# Patient Record
Sex: FEMALE | Race: WHITE | HISPANIC OR LATINO | Employment: FULL TIME | URBAN - METROPOLITAN AREA
[De-identification: names, ages, dates, MRNs, and addresses within clinical notes are randomized per-mention and may not be internally consistent; named-entity substitution may affect disease eponyms.]

---

## 2017-02-17 ENCOUNTER — NON-PROVIDER VISIT (OUTPATIENT)
Dept: URGENT CARE | Facility: PHYSICIAN GROUP | Age: 27
End: 2017-02-17

## 2017-02-17 DIAGNOSIS — Z02.1 PRE-EMPLOYMENT DRUG SCREENING: ICD-10-CM

## 2017-02-17 LAB
AMP AMPHETAMINE: POSITIVE
BAR BARBITURATES: NORMAL
BZO BENZODIAZEPINES: NORMAL
COC COCAINE: NORMAL
INT CON NEG: NEGATIVE
INT CON POS: POSITIVE
MDMA ECSTASY: NORMAL
MET METHAMPHETAMINES: NORMAL
MTD METHADONE: NORMAL
OPI OPIATES: NORMAL
OXY OXYCODONE: NORMAL
PCP PHENCYCLIDINE: NORMAL
POC URINE DRUG SCREEN OCDRS: NORMAL
THC: NORMAL

## 2017-02-17 PROCEDURE — 80305 DRUG TEST PRSMV DIR OPT OBS: CPT | Performed by: PHYSICIAN ASSISTANT

## 2017-02-17 NOTE — MR AVS SNAPSHOT
Tayler Diaz-Phyllis   2017 11:45 AM   Non-Provider Visit   MRN: [id number]    Department:  [facility]   Dept Phone:  [phone]    Description:  Female : 1990   Provider:  MICHAEL [facility]           Reason for Visit     Drug / Alcohol Assessment           Allergies as of 2017     Allergen Noted Reactions    No Known Drug Allergy 2011         You were diagnosed with     Pre-employment drug screening   [[id number]]         Vital Signs     Smoking Status                   Former Smoker           Basic Information     Date Of Birth Sex Race Ethnicity Preferred Language    1990 Female  or   Origin (Cymraes,Samoan,Indian,East [place]orese, etc) English      Problem List              ICD-10-CM Priority Class Noted - Resolved    Obese E66.9   2011 - Present    Seasonal allergies J30.2   2013 - Present    Sinusitis J32.9   2013 - Present      Health Maintenance        Date Due Completion Dates    PAP SMEAR 2014    IMM INFLUENZA (1) 2011    IMM DTaP/Tdap/Td Vaccine (2 - Td) 2021            Results     POCT 11 Panel Urine Drug Screen      Component    AMPHETAMINE    positive    COCAINE    POC THC    METHAMPHETAMINES    OPIATES    PHENCYCLIDINE    BENZODIAZIPINES    BARBITURATES    METHADONE    MDMA Ecstasy    OXYCODONE    Urine Drug Screen    positive for AMP    Internal Control Positive    Positive    Internal Control Negative    Negative                        Current Immunizations     HPV Quadrivalent Vaccine (GARDASIL) 3/19/2014, 10/16/2013, 2013    Influenza TIV (IM) 2011  5:15 AM    MMR Vaccine 2011 10:45 AM    Tdap Vaccine 2011  5:15 AM      Below and/or attached are the medications your provider expects you to take. Review all of your home medications and newly ordered medications with your provider and/or pharmacist. Follow medication instructions as directed by your provider and/or  pharmacist. Please keep your medication list with you and share with your provider. Update the information when medications are discontinued, doses are changed, or new medications (including over-the-counter products) are added; and carry medication information at all times in the event of emergency situations     Allergies:  NO KNOWN DRUG ALLERGY - (reactions not documented)               Medications  Valid as of: February 17, 2017 -  2:36 PM    Generic Name Brand Name Tablet Size Instructions for use    Amoxicillin-Pot Clavulanate (Tab) AUGMENTIN 875-125 MG Take 1 Tab by mouth 2 times a day.        Hydrocod Polst-Chlorphen Polst (Liquid CR) TUSSIONEX 10-8 MG/5ML Take 5 mL by mouth every 12 hours.        Nutritional Supplements   Take  by mouth.        Terbinafine HCl (Tab) LAMISIL 250 MG Take 1 Tab by mouth every day.        .                 Medicines prescribed today were sent to:     [facility]  [place] - [address]    [address] [facility] [address]    Phone: [phone] Fax: [phone]    Open 24 Hours?: No    [facility] [address] [place] [address] [place] [address] [address] [facility] [address]    [facility] [place] [address]    Phone: [phone] Fax: [phone]    Open 24 Hours?: No      Medication refill instructions:       If your prescription bottle indicates you have medication refills left, it is not necessary to call your provider’s office. Please contact your pharmacy and they will refill your medication.    If your prescription bottle indicates you do not have any refills left, you may request refills at any time through one of the following ways: The online [employer] system (except Urgent Care), by calling your provider’s office, or by asking your pharmacy to contact your provider’s office with a refill request. Medication refills are processed only during regular business hours and may not be available until the next business day. Your provider may request additional  information or to have a follow-up visit with you prior to refilling your medication.   *Please Note: Medication refills are assigned a new Rx number when refilled electronically. Your pharmacy may indicate that no refills were authorized even though a new prescription for the same medication is available at the pharmacy. Please request the medicine by name with the pharmacy before contacting your provider for a refill.        Instructions    Tayler Ellison is a 27 y.o. female here for a non-provider visit for uds    If abnormal was an in office provider notified today (if so, indicate provider)? No  Routed to PCP? No         Other Notes About Your Plan      IOL scheduled for: 11/20 @ 0800 (pitocin). Pt needs instructions.           Qloo Access Code: [email or web address]  Expires: 3/19/2017  2:36 PM    Qloo  A secure, online tool to manage your health information     [employer]’s Qloo® is a secure, online tool that connects you to your personalized health information from the privacy of your home -- day or night - making it very easy for you to manage your healthcare. Once the activation process is completed, you can even access your medical information using the Qloo jethro, which is available for free in the Apple Jethro store or Google Play store.     Qloo provides the following levels of access (as shown below):   My Chart Features   [facility] Doctor [facility]  Specialists [facility]  Urgent  Care [facility]  Primary Care  Doctor   Email your healthcare team securely and privately 24/7 X X X    Manage appointments: schedule your next appointment; view details of past/upcoming appointments X      Request prescription refills. X      View recent personal medical records, including lab and immunizations X X X X   View health record, including health history, allergies, medications X X X X   Read reports about your outpatient visits, procedures, consult and ER notes X X X X   See your discharge  summary, which is a recap of your hospital and/or ER visit that includes your diagnosis, lab results, and care plan. X X       How to register for TransLattice:  1. Go to  [email or web address].  2. Click on the Sign Up Now box, which takes you to the New Member Sign Up page. You will need to provide the following information:  a. Enter your TransLattice Access Code exactly as it appears at the top of this page. (You will not need to use this code after you’ve completed the sign-up process. If you do not sign up before the expiration date, you must request a new code.)   b. Enter your date of birth.   c. Enter your home email address.   d. Click Submit, and follow the next screen’s instructions.  3. Create a TransLattice ID. This will be your Crewt login ID and cannot be changed, so think of one that is secure and easy to remember.  4. Create a Crewt password. You can change your password at any time.  5. Enter your Password Reset Question and Answer. This can be used at a later time if you forget your password.   6. Enter your e-mail address. This allows you to receive e-mail notifications when new information is available in TransLattice.  7. Click Sign Up. You can now view your health information.    For assistance activating your TransLattice account, call [phone]

## 2017-02-17 NOTE — PATIENT INSTRUCTIONS
Tayler Ellison is a 27 y.o. female here for a non-provider visit for uds    If abnormal was an in office provider notified today (if so, indicate provider)? No  Routed to PCP? No

## 2019-08-21 ENCOUNTER — TELEPHONE (OUTPATIENT)
Dept: SCHEDULING | Facility: IMAGING CENTER | Age: 29
End: 2019-08-21

## 2019-11-08 ENCOUNTER — OFFICE VISIT (OUTPATIENT)
Dept: MEDICAL GROUP | Facility: PHYSICIAN GROUP | Age: 29
End: 2019-11-08
Payer: COMMERCIAL

## 2019-11-08 VITALS
HEIGHT: 62 IN | OXYGEN SATURATION: 98 % | TEMPERATURE: 97.5 F | HEART RATE: 80 BPM | BODY MASS INDEX: 48.03 KG/M2 | WEIGHT: 261 LBS | SYSTOLIC BLOOD PRESSURE: 106 MMHG | DIASTOLIC BLOOD PRESSURE: 78 MMHG

## 2019-11-08 DIAGNOSIS — N64.4 BREAST PAIN, LEFT: ICD-10-CM

## 2019-11-08 DIAGNOSIS — L73.2 AXILLARY HIDRADENITIS SUPPURATIVA: ICD-10-CM

## 2019-11-08 DIAGNOSIS — E66.01 MORBID OBESITY WITH BMI OF 45.0-49.9, ADULT (HCC): ICD-10-CM

## 2019-11-08 DIAGNOSIS — Z00.00 BLOOD TESTS FOR ROUTINE GENERAL PHYSICAL EXAMINATION: ICD-10-CM

## 2019-11-08 PROCEDURE — 99204 OFFICE O/P NEW MOD 45 MIN: CPT | Performed by: PHYSICIAN ASSISTANT

## 2019-11-08 SDOH — HEALTH STABILITY: MENTAL HEALTH: HOW OFTEN DO YOU HAVE A DRINK CONTAINING ALCOHOL?: MONTHLY OR LESS

## 2019-11-08 ASSESSMENT — PATIENT HEALTH QUESTIONNAIRE - PHQ9: CLINICAL INTERPRETATION OF PHQ2 SCORE: 0

## 2019-11-09 ENCOUNTER — HOSPITAL ENCOUNTER (OUTPATIENT)
Dept: LAB | Facility: MEDICAL CENTER | Age: 29
End: 2019-11-09
Attending: PHYSICIAN ASSISTANT
Payer: COMMERCIAL

## 2019-11-09 DIAGNOSIS — Z00.00 BLOOD TESTS FOR ROUTINE GENERAL PHYSICAL EXAMINATION: ICD-10-CM

## 2019-11-09 LAB
ALBUMIN SERPL BCP-MCNC: 3.7 G/DL (ref 3.2–4.9)
ALBUMIN/GLOB SERPL: 1.1 G/DL
ALP SERPL-CCNC: 95 U/L (ref 30–99)
ALT SERPL-CCNC: 30 U/L (ref 2–50)
ANION GAP SERPL CALC-SCNC: 4 MMOL/L (ref 0–11.9)
AST SERPL-CCNC: 28 U/L (ref 12–45)
BILIRUB SERPL-MCNC: 0.5 MG/DL (ref 0.1–1.5)
BUN SERPL-MCNC: 13 MG/DL (ref 8–22)
CALCIUM SERPL-MCNC: 9 MG/DL (ref 8.5–10.5)
CHLORIDE SERPL-SCNC: 106 MMOL/L (ref 96–112)
CHOLEST SERPL-MCNC: 125 MG/DL (ref 100–199)
CO2 SERPL-SCNC: 27 MMOL/L (ref 20–33)
CREAT SERPL-MCNC: 0.66 MG/DL (ref 0.5–1.4)
EST. AVERAGE GLUCOSE BLD GHB EST-MCNC: 111 MG/DL
FASTING STATUS PATIENT QL REPORTED: NORMAL
GLOBULIN SER CALC-MCNC: 3.4 G/DL (ref 1.9–3.5)
GLUCOSE SERPL-MCNC: 75 MG/DL (ref 65–99)
HBA1C MFR BLD: 5.5 % (ref 0–5.6)
HDLC SERPL-MCNC: 40 MG/DL
LDLC SERPL CALC-MCNC: 71 MG/DL
POTASSIUM SERPL-SCNC: 3.9 MMOL/L (ref 3.6–5.5)
PROT SERPL-MCNC: 7.1 G/DL (ref 6–8.2)
SODIUM SERPL-SCNC: 137 MMOL/L (ref 135–145)
TRIGL SERPL-MCNC: 70 MG/DL (ref 0–149)
TSH SERPL DL<=0.005 MIU/L-ACNC: 0.75 UIU/ML (ref 0.38–5.33)

## 2019-11-09 PROCEDURE — 80061 LIPID PANEL: CPT

## 2019-11-09 PROCEDURE — 80053 COMPREHEN METABOLIC PANEL: CPT

## 2019-11-09 PROCEDURE — 84443 ASSAY THYROID STIM HORMONE: CPT

## 2019-11-09 PROCEDURE — 36415 COLL VENOUS BLD VENIPUNCTURE: CPT

## 2019-11-09 PROCEDURE — 83036 HEMOGLOBIN GLYCOSYLATED A1C: CPT

## 2019-11-09 NOTE — PROGRESS NOTES
"Tayler Ellison is a 29 y.o. female here for left breast pain. Is a new patient to me and [facility] and is also establishing care today.    Previous PCP: Dr. Luciano Silva's    HPI:      Patient presents to the office today to establish care with me.  Her primary concern is regarding left breast pain that she has been experiencing recently.  She feels the pain around her nipple.  She is requesting referral to Dr. Yumiko Wyman, whom she has seen in the past on multiple occasions for issues with her right breast.  Patient states that she has had several surgeries on the right breast for \"clogged ducts.\"  She has not noticed any masses of her breasts.    She also expresses concern regarding recurrent small abscesses in her armpits.  Has had this issue for quite a while, endorses previous antibiotics but it never seems to get any better.      Current medicines (including changes today)  No current outpatient medications on file.     No current facility-administered medications for this visit.      She  has a past medical history of Allergy, Depression, GERD (gastroesophageal reflux disease), Headache(784.0), and Urinary tract infection, site not specified. She also has no past medical history of Addisons disease (HCC), Adrenal disorder (HCC), Anemia, Anxiety, Arrhythmia, Arthritis, ASTHMA, Blood transfusion, Cancer (HCC), CATARACT, CHF (congestive heart failure) (HCC), Clotting disorder (HCC), COPD, Cushings syndrome (HCC), Diabetes, Diabetic neuropathy (HCC), EMPHYSEMA, Glaucoma, Goiter, Heart attack (HCC), Heart murmur, HIV (human immunodeficiency virus infection), Hyperlipidemia, Hypertension, IBD (inflammatory bowel disease), Kidney disease, Meningitis, Migraine, Muscle disorder, OSTEOPOROSIS, Parathyroid disorder (HCC), Pituitary disease (HCC), Seizure (HCC), Sickle cell disease (HCC), Stroke (HCC), Substance abuse (HCC), Thyroid disease, Tuberculosis, or Ulcer.  She  has a past surgical history that includes " "lumpectomy (Right).  Social History     Tobacco Use   • Smoking status: Never Smoker   • Smokeless tobacco: Never Used   Substance Use Topics   • Alcohol use: Yes     Frequency: Monthly or less     Comment: occasonal/weekend    • Drug use: No     Social History     Patient does not qualify to have social determinant information on file (likely too young).   Social History Narrative   • Not on file     Family History   Problem Relation Age of Onset   • Diabetes Mother         insulin   • Heart Disease Mother    • Heart Disease Sister    • Diabetes Paternal Aunt    • Heart Disease Paternal Aunt    • Diabetes Paternal Uncle    • Heart Disease Paternal Uncle      Family Status   Relation Name Status   • Mo  Alive   • Fa  Alive   • Sis 3 Alive   • Bro 3 Alive   • MGMo     • MGFa  Alive   • PGMo     • PGFa     • MAunt  Alive   • MUnc  Alive   • PAunt  Alive   • PUnc  Alive         ROS  Pertinent ROS as per HPI  All other systems reviewed and are negative     Objective:     /78 (BP Location: Right arm, Patient Position: Sitting, BP Cuff Size: Large adult)   Pulse 80   Temp 36.4 °C (97.5 °F)   Ht 1.575 m (5' 2\")   Wt 118.4 kg (261 lb)   SpO2 98%  Body mass index is 47.74 kg/m².  Physical Exam:    Constitutional: Alert, morbidly obese but otherwise well-appearing, very pleasant, no distress.  Skin: Warm, dry, good turgor. Multiple hypertrophic scars noted in bilateral axillae.  Eye: Equal, round and reactive, conjunctiva clear, lids normal.  ENMT: Lips without lesions, good dentition, oropharynx clear.  Neck: Trachea midline, no masses, no thyromegaly. No cervical or supraclavicular lymphadenopathy.  Breast: Breasts are normal-appearing and symmetric bilaterally. Well-healed surgical scars visible on right breast. Otherwise no skin abnormalities visualized. There are no masses palpated, +tenderness of left nipple/areola. No visible nipple discharge. No axillary or supraclavicular " lymphadenopathy palpated.  Respiratory: Unlabored respiratory effort, lungs clear to auscultation, no wheezes, no ronchi.  Cardiovascular: Normal S1, S2, no murmur, no edema.  Abdomen: Soft, non-tender, no masses, no hepatosplenomegaly.  Psych: Alert and oriented x3, normal affect and mood.      Assessment and Plan:   The following treatment plan was discussed    1. Breast pain, left  New problem, uncontrolled. Having new-onset left breast pain/tenderness in nipple area. Recommend US to further evaluate. I have placed referral for her to follow-up with her previous breast surgeon, Dr. Wyman.  - US-BREAST LIMITED-LEFT; Future  - REFERRAL TO GENERAL SURGERY    2. Axillary hidradenitis suppurativa  New problem, uncontrolled. Exam consistent with HS. Recommend further evaluation by dermatology which I have ordered.  - REFERRAL TO DERMATOLOGY    3. Morbid obesity with BMI of 45.0-49.9, adult (HCC)  - REFERRAL TO [facility] (HIP) Services Requested: Physician Medical Weight Management Program, Registered Dietitian for Medical Nutrition Therapy; Reason for Referral? BMI>30; Reason for Visit: Overweight/Obesity  - Patient identified as having weight management issue.  Appropriate orders and counseling given.    4. Blood tests for routine general physical examination  Patient advised to have fasting screening lab work done at earliest convenience. Is at risk for metabolic syndrome given her obesity.  - Comp Metabolic Panel; Future  - HEMOGLOBIN A1C; Future  - TSH WITH REFLEX TO FT4; Future  - Lipid Profile; Future      Followup: Return for f/u pending lab results.    Leticia Haq P.A.-C.

## 2019-12-18 ENCOUNTER — HOSPITAL ENCOUNTER (OUTPATIENT)
Dept: RADIOLOGY | Facility: MEDICAL CENTER | Age: 29
End: 2019-12-18

## 2019-12-18 ENCOUNTER — HOSPITAL ENCOUNTER (OUTPATIENT)
Dept: RADIOLOGY | Facility: MEDICAL CENTER | Age: 29
End: 2019-12-18
Attending: PHYSICIAN ASSISTANT

## 2019-12-19 ENCOUNTER — HOSPITAL ENCOUNTER (OUTPATIENT)
Dept: RADIOLOGY | Facility: MEDICAL CENTER | Age: 29
End: 2019-12-19
Attending: PHYSICIAN ASSISTANT
Payer: COMMERCIAL

## 2019-12-19 DIAGNOSIS — N64.4 BREAST PAIN, LEFT: ICD-10-CM

## 2019-12-19 PROCEDURE — 76642 ULTRASOUND BREAST LIMITED: CPT | Mod: LT

## 2019-12-19 PROCEDURE — G0279 TOMOSYNTHESIS, MAMMO: HCPCS

## 2020-01-13 ENCOUNTER — OFFICE VISIT (OUTPATIENT)
Dept: DERMATOLOGY | Facility: IMAGING CENTER | Age: 30
End: 2020-01-13
Payer: COMMERCIAL

## 2020-01-13 VITALS — BODY MASS INDEX: 47.29 KG/M2 | HEIGHT: 62 IN | WEIGHT: 257 LBS

## 2020-01-13 DIAGNOSIS — L73.2 HIDRADENITIS SUPPURATIVA: ICD-10-CM

## 2020-01-13 PROCEDURE — 99203 OFFICE O/P NEW LOW 30 MIN: CPT | Performed by: DERMATOLOGY

## 2020-01-13 RX ORDER — DOXYCYCLINE 100 MG/1
100 CAPSULE ORAL 2 TIMES DAILY
Qty: 180 CAP | Refills: 1 | Status: SHIPPED | OUTPATIENT
Start: 2020-01-13 | End: 2020-07-04

## 2020-01-13 RX ORDER — CLINDAMYCIN PHOSPHATE 10 UG/ML
LOTION TOPICAL
Qty: 60 ML | Refills: 5 | Status: SHIPPED | OUTPATIENT
Start: 2020-01-13 | End: 2020-07-04

## 2020-01-13 NOTE — PROGRESS NOTES
DERMATOLOGY NOTE  NEW VISIT     01/13/20  Tayler Ellison  1990  MRN: [id number]     Chief complaint: Establish Care       Tayler Ellison is a 30 y.o. female who presents for     HPI:painful draining lesions in the armpits and left inguinal fold and scarring  Onset: 15 years ago   Symptoms: bumps that fill up with fluid and drain  Aggravating factors: menstrual cycle   Alleviating factors: n/a   Treatments: antibiotics 15 years ago    History of skin cancer: No  History of precancers/actinic keratoses: No  History of biopsies:No  History of blistering/severe sunburns:No  Family history of skin cancer:No  Family history of atypical moles:No      Past Medical History:   Diagnosis Date   • Allergy    • Depression    • GERD (gastroesophageal reflux disease)    • Headache(784.0)    • Urinary tract infection, site not specified         Family History   Problem Relation Age of Onset   • Diabetes Mother         insulin   • Heart Disease Mother    • Heart Disease Sister    • Diabetes Paternal Aunt    • Heart Disease Paternal Aunt    • Diabetes Paternal Uncle    • Heart Disease Paternal Uncle         Social History     Socioeconomic History   • Marital status:      Spouse name: Not on file   • Number of children: Not on file   • Years of education: Not on file   • Highest education level: Not on file   Occupational History   • Not on file   Social Needs   • Financial resource strain: Not on file   • Food insecurity:     Worry: Not on file     Inability: Not on file   • Transportation needs:     Medical: Not on file     Non-medical: Not on file   Tobacco Use   • Smoking status: Never Smoker   • Smokeless tobacco: Never Used   Substance and Sexual Activity   • Alcohol use: Yes     Frequency: Monthly or less     Comment: occasonal/weekend    • Drug use: No   • Sexual activity: Yes     Partners: Male     Birth control/protection: Pill   Lifestyle   • Physical activity:     Days per week: Not on file     Minutes per  "session: Not on file   • Stress: Not on file   Relationships   • Social connections:     Talks on phone: Not on file     Gets together: Not on file     Attends Worship service: Not on file     Active member of club or organization: Not on file     Attends meetings of clubs or organizations: Not on file     Relationship status: Not on file   • Intimate partner violence:     Fear of current or ex partner: Not on file     Emotionally abused: Not on file     Physically abused: Not on file     Forced sexual activity: Not on file   Other Topics Concern   • Not on file   Social History Narrative   • Not on file        Allergies   Allergen Reactions   • No Known Drug Allergy         MEDICATIONS:  Medications relevant to specialty reviewed.  No current outpatient medications on file.     REVIEW OF SYSTEMS:   Positive for skin (see HPI)  Negative for fevers, chills and cough  All other systems reviewed and are negative.     EXAM:  Ht 1.575 m (5' 2\")   Wt 116.6 kg (257 lb)   BMI 47.01 kg/m²   Constitutional: Well-developed, well-nourished, and in no distress.   HENT:   Head: normocephalic and atraumatic.  Right Ear: External ear normal.   Left Ear: External ear normal.   Nose: Nose normal.   Eyes: Conjunctivae and lids are normal.   Neck: Normal range of motion. Neck supple.   Pulmonary/Chest: Effort normal.   Neurological: Alert and oriented.    A focused cutaneous exam was completed including: ears, face, neck, chest, abdomen, bilateral axillae and bilateral inguinal folds with the following pertinent findings listed below. Remaining above-listed examined areas within normal limits / negative for rashes or lesions.     -bilateral axillae and left inguinal fold with diffuse open comedones, sinus tracts, rope-like scarring and few draining firm tender erythematous nodules/cystic lesions       IMPRESSION / PLAN:    1. Hidradenitis suppurativa, Ypa stage 3  - educated patient about diagnosis, management options, and " expectations of treatment  - discussed risk factors/lifestyle modifications that are associated with this disease, including weight loss and not smoking  - discussed bathing habits, recommend chlorhexidine wash (to avoid face and genital area)  - start clindamycin 1% lotion daily to affected areas  - instructed to start doxycycline 100mg BID. Instructed to take with food & water. Side effects including, but not limited to, GI upset, photosensitivity (and need for photoprotection) discussed.   - patient aware that this medication is not safe during pregnancy, and has been advised to stop medication if she is to become pregnant  - discussed benefit of ILK treatment for acutely inflamed lesions, patient was informed to call the office is she needs this intervention   - discussed future treatment options if she fails above management, including Humira or could consider trial of spironolactone given flaring with menses  - discussed cosmetic interventions that may be beneficial, including laser hair removal  - discussed surgical options, would refer to general surgery, declining at this time     Return to clinic in: Return in about 3 months (around 4/13/2020). and as needed for any new or changing skin lesions.    Corry Moran M.D.

## 2020-02-11 ENCOUNTER — HOSPITAL ENCOUNTER (OUTPATIENT)
Facility: MEDICAL CENTER | Age: 30
End: 2020-02-11
Attending: FAMILY MEDICINE
Payer: COMMERCIAL

## 2020-02-11 LAB
ALBUMIN SERPL BCP-MCNC: 3.8 G/DL (ref 3.2–4.9)
ALBUMIN/GLOB SERPL: 1 G/DL
ALP SERPL-CCNC: 100 U/L (ref 30–99)
ALT SERPL-CCNC: 33 U/L (ref 2–50)
ANION GAP SERPL CALC-SCNC: 14 MMOL/L (ref 0–11.9)
AST SERPL-CCNC: 27 U/L (ref 12–45)
BILIRUB SERPL-MCNC: 0.5 MG/DL (ref 0.1–1.5)
BUN SERPL-MCNC: 15 MG/DL (ref 8–22)
CALCIUM SERPL-MCNC: 9.1 MG/DL (ref 8.5–10.5)
CHLORIDE SERPL-SCNC: 106 MMOL/L (ref 96–112)
CO2 SERPL-SCNC: 20 MMOL/L (ref 20–33)
CREAT SERPL-MCNC: 0.75 MG/DL (ref 0.5–1.4)
GLOBULIN SER CALC-MCNC: 3.8 G/DL (ref 1.9–3.5)
GLUCOSE SERPL-MCNC: 91 MG/DL (ref 65–99)
POTASSIUM SERPL-SCNC: 4.4 MMOL/L (ref 3.6–5.5)
PROT SERPL-MCNC: 7.6 G/DL (ref 6–8.2)
SODIUM SERPL-SCNC: 140 MMOL/L (ref 135–145)
TSH SERPL DL<=0.005 MIU/L-ACNC: 0.86 UIU/ML (ref 0.38–5.33)

## 2020-02-11 PROCEDURE — 84443 ASSAY THYROID STIM HORMONE: CPT

## 2020-02-11 PROCEDURE — 80053 COMPREHEN METABOLIC PANEL: CPT

## 2020-07-01 ENCOUNTER — OFFICE VISIT (OUTPATIENT)
Dept: URGENT CARE | Facility: PHYSICIAN GROUP | Age: 30
End: 2020-07-01
Payer: COMMERCIAL

## 2020-07-01 ENCOUNTER — HOSPITAL ENCOUNTER (OUTPATIENT)
Facility: MEDICAL CENTER | Age: 30
End: 2020-07-01
Attending: PHYSICIAN ASSISTANT
Payer: COMMERCIAL

## 2020-07-01 ENCOUNTER — HOSPITAL ENCOUNTER (OUTPATIENT)
Dept: RADIOLOGY | Facility: MEDICAL CENTER | Age: 30
End: 2020-07-01
Attending: PHYSICIAN ASSISTANT
Payer: COMMERCIAL

## 2020-07-01 VITALS
HEART RATE: 73 BPM | WEIGHT: 256 LBS | DIASTOLIC BLOOD PRESSURE: 76 MMHG | SYSTOLIC BLOOD PRESSURE: 146 MMHG | OXYGEN SATURATION: 99 % | TEMPERATURE: 97 F | HEIGHT: 62 IN | BODY MASS INDEX: 47.11 KG/M2 | RESPIRATION RATE: 14 BRPM

## 2020-07-01 DIAGNOSIS — R10.30 LOWER ABDOMINAL PAIN: ICD-10-CM

## 2020-07-01 DIAGNOSIS — R10.2 PELVIC PAIN: ICD-10-CM

## 2020-07-01 DIAGNOSIS — R10.2 PELVIC PAIN: Primary | ICD-10-CM

## 2020-07-01 LAB
APPEARANCE UR: CLEAR
BILIRUB UR STRIP-MCNC: NEGATIVE MG/DL
COLOR UR AUTO: YELLOW
GLUCOSE UR STRIP.AUTO-MCNC: NEGATIVE MG/DL
INT CON NEG: NEGATIVE
INT CON POS: POSITIVE
KETONES UR STRIP.AUTO-MCNC: NEGATIVE MG/DL
LEUKOCYTE ESTERASE UR QL STRIP.AUTO: NORMAL
NITRITE UR QL STRIP.AUTO: NEGATIVE
PH UR STRIP.AUTO: 6 [PH] (ref 5–8)
POC URINE PREGNANCY TEST: NEGATIVE
PROT UR QL STRIP: NEGATIVE MG/DL
RBC UR QL AUTO: NORMAL
SP GR UR STRIP.AUTO: 1.01
UROBILINOGEN UR STRIP-MCNC: 0.2 MG/DL

## 2020-07-01 PROCEDURE — 81025 URINE PREGNANCY TEST: CPT | Performed by: PHYSICIAN ASSISTANT

## 2020-07-01 PROCEDURE — 87086 URINE CULTURE/COLONY COUNT: CPT

## 2020-07-01 PROCEDURE — 81002 URINALYSIS NONAUTO W/O SCOPE: CPT | Performed by: PHYSICIAN ASSISTANT

## 2020-07-01 PROCEDURE — 76830 TRANSVAGINAL US NON-OB: CPT

## 2020-07-01 PROCEDURE — 99215 OFFICE O/P EST HI 40 MIN: CPT | Performed by: PHYSICIAN ASSISTANT

## 2020-07-01 PROCEDURE — 87077 CULTURE AEROBIC IDENTIFY: CPT

## 2020-07-01 PROCEDURE — 87186 SC STD MICRODIL/AGAR DIL: CPT

## 2020-07-01 NOTE — PROGRESS NOTES
Subjective:      Tayler Ellison is a 30 y.o. female who presents with Pelvic Pain (x1day )    Medications:    • CLINDAMYCIN PHOSPHATE(TOPICAL) Lotn  • doxycycline    Allergies: No known drug allergy    Problem List: Tayler Ellison has Obese; Seasonal allergies; Morbid obesity with BMI of 45.0-49.9, adult (HCC); and Axillary hidradenitis suppurativa on their problem list.    Surgical History:  Past Surgical History:   Procedure Laterality Date   • LUMPECTOMY Right     x3       Past Social Hx: Tayler Ellison  reports that she has never smoked. She has never used smokeless tobacco. She reports current alcohol use. She reports that she does not use drugs.     Past Family Hx:  Tayler Ellison family history includes Diabetes in her mother, paternal aunt, and paternal uncle; Heart Disease in her mother, paternal aunt, paternal uncle, and sister.     Problem list, medications, and allergies reviewed by myself today in Epic.            Patient presents with:  Pelvic Pain: x1day , severe, crampy and sharp, bilateral though worse on the left than the right.  Pt and  are actively trying to conceive a child, feels similar to previous ectopic pregnancy that was on the right side even with negative pregnancy test (was dx by US).  Pt denies vaginal bleeding today but noticed some spotting yesterday.  Pt has not done home pregnancy test.  Denies fever, chills, dysuria, or discharge. No hx of diverticulitis. 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      Pelvic Pain   This is a new problem. The current episode started today. The problem occurs constantly. The problem has been rapidly worsening. Associated symptoms include abdominal pain, anorexia and nausea. Pertinent negatives include no change in bowel habit, chest pain, coughing, fever, urinary symptoms or vomiting. The symptoms are aggravated by exertion, walking and standing (palaption). She has tried  "NSAIDs, position changes and rest for the symptoms. The treatment provided no relief.       Review of Systems   Constitutional: Positive for malaise/fatigue. Negative for fever.   Respiratory: Negative for cough.    Cardiovascular: Negative for chest pain.   Gastrointestinal: Positive for abdominal pain, anorexia and nausea. Negative for blood in stool, change in bowel habit, constipation, diarrhea, heartburn and vomiting.   Genitourinary: Positive for pelvic pain. Negative for dysuria, frequency, hematuria and urgency.   All other systems reviewed and are negative.         Objective:     /76 (BP Location: Left arm, Patient Position: Sitting, BP Cuff Size: Large adult)   Pulse 73   Temp 36.1 °C (97 °F) (Temporal)   Resp 14   Ht 1.575 m (5' 2\")   Wt 116.1 kg (256 lb)   SpO2 99%   BMI 46.82 kg/m²      Physical Exam  Vitals signs and nursing note reviewed.   Constitutional:       General: She is not in acute distress.     Appearance: Normal appearance. She is well-developed. She is obese. She is not ill-appearing or toxic-appearing.   HENT:      Head: Normocephalic and atraumatic.      Nose: Nose normal.      Mouth/Throat:      Mouth: Mucous membranes are moist.   Eyes:      Extraocular Movements: Extraocular movements intact.      Conjunctiva/sclera: Conjunctivae normal.      Pupils: Pupils are equal, round, and reactive to light.   Neck:      Musculoskeletal: Normal range of motion and neck supple.   Cardiovascular:      Rate and Rhythm: Normal rate and regular rhythm.      Pulses: Normal pulses.      Heart sounds: Normal heart sounds.   Pulmonary:      Effort: Pulmonary effort is normal.      Breath sounds: Normal breath sounds.   Abdominal:      General: Abdomen is protuberant. Bowel sounds are normal. There is no distension or abdominal bruit.      Palpations: Abdomen is soft.      Tenderness: There is abdominal tenderness in the suprapubic area and left lower quadrant. There is no guarding or " rebound. Negative signs include Magallanes's sign and McBurney's sign.       Musculoskeletal: Normal range of motion.   Skin:     General: Skin is warm and dry.      Capillary Refill: Capillary refill takes less than 2 seconds.   Neurological:      General: No focal deficit present.      Mental Status: She is alert and oriented to person, place, and time.      Gait: Gait normal.   Psychiatric:         Mood and Affect: Mood normal.         Behavior: Behavior is cooperative.             Lab Results   Component Value Date/Time    POCCOLOR yellow 07/01/2020 04:33 PM    POCAPPEAR clear 07/01/2020 04:33 PM    POCLEUKEST trace 07/01/2020 04:33 PM    POCNITRITE negative 07/01/2020 04:33 PM    POCUROBILIGE 0.2 07/01/2020 04:33 PM    POCPROTEIN negative 07/01/2020 04:33 PM    POCURPH 6.0 07/01/2020 04:33 PM    POCBLOOD trace 07/01/2020 04:33 PM    POCSPGRV 1.010 07/01/2020 04:33 PM    POCKETONES negative 07/01/2020 04:33 PM    POCBILIRUBIN negative 07/01/2020 04:33 PM    POCGLUCUA negative 07/01/2020 04:33 PM        RADIOLOGY RESULTS   Ct-renal Colic Evaluation(a/p W/o)    Result Date: 7/2/2020 7/2/2020 11:51 AM HISTORY/REASON FOR EXAM:  abdominal pain. Lower abdominal and pelvic pain. TECHNIQUE/EXAM DESCRIPTION AND NUMBER OF VIEWS: CT scan renal/colic without contrast. 5 mm helical images of the abdomen and pelvis were obtained from the diaphragmatic domes through the pubic symphysis. Low dose optimization technique was utilized for this CT exam including automated exposure control and adjustment of the mA and/or kV according to patient size. COMPARISON: None. FINDINGS: The liver is unremarkable. The spleen is unremarkable. Adrenal glands are unremarkable. The kidneys are unremarkable. The pancreas is unremarkable. The gallbladder is unremarkable. Bladder is unremarkable. Uterus and ovaries are grossly unremarkable. Appendix has a normal appearance. No pneumoperitoneum. Trace pelvic fluid. Sparse colonic diverticula. Abdominal  aorta is unremarkable. No major bony abnormality is seen. Small sclerotic lesion in the LEFT ilium posteriorly consistent with bone island.     1.  No renal stone or hydronephrosis. 2.  Normal appendix. 3.  Colonic diverticulosis without evidence for diverticulitis. 4.  Trace pelvic fluid, likely physiologic.    Us-pelvic Complete (transabdominal/transvaginal) (combo)    Result Date: 7/1/2020 7/1/2020 5:07 PM HISTORY/REASON FOR EXAM:  Pain; history of tubal pregnancy with neg hcg, feels very similar TECHNIQUE/EXAM DESCRIPTION: Transabdominal and transvaginal pelvic ultrasound. COMPARISON:   None FINDINGS: Both transabdominal and transvaginal scanning were performed to optimally visualize the pelvis. The uterus measures 4.75 cm x 8.45 cm x 5.71 cm. The uterine myometrium is within normal limits. The endometrial echo complex measures 0.68 cm. Low resistive waveforms are confirmed within the ovaries. The right ovary measures 4.18 cm x 2.03 cm x 3.12 cm transabdominally. There is likely an involuting cyst in the right ovary measuring 1.6 x 1 x 1.3 cm The left ovary measures 3.00 cm x 1.78 cm x 2.23 cm transabdominally. There is trace free fluid in the pelvis.     Trace free fluid in the pelvis. Probable involuting right ovarian cyst measuring 1.6 cm.            Preg: neg     Assessment/Plan:     1. Pelvic pain  POCT Urinalysis    POCT Pregnancy    US-PELVIC COMPLETE (TRANSABDOMINAL/TRANSVAGINAL) (COMBO)    CT-RENAL COLIC EVALUATION(A/P W/O)    URINE CULTURE(NEW)   2. Lower abdominal pain  CT-RENAL COLIC EVALUATION(A/P W/O)    URINE CULTURE(NEW)     Culture sent to lab, will call with any necessary treatment or treatment changes.     PT can continue OTC medications, increase fluids and rest until symptoms improve.     PT should follow up with PCP in 1-2 days for re-evaluation if symptoms have not improved.  Discussed red flags and reasons to return to UC or ED.  Pt and/or family verbalized understanding of diagnosis and  follow up instructions and was offered informational handout on diagnosis.  PT discharged.

## 2020-07-01 NOTE — LETTER
July 1, 2020         Patient: Tayler Ellison   YOB: 1990   Date of Visit: 7/1/2020           To Whom it May Concern:    Tayler Ellison was seen in my clinic on 7/1/2020. She may return to work on 7/3/2020.    If you have any questions or concerns, please don't hesitate to call.        Sincerely,           Renita Nguyen P.A.-C.  Electronically Signed

## 2020-07-02 ENCOUNTER — TELEPHONE (OUTPATIENT)
Dept: URGENT CARE | Facility: CLINIC | Age: 30
End: 2020-07-02

## 2020-07-02 ENCOUNTER — HOSPITAL ENCOUNTER (OUTPATIENT)
Dept: RADIOLOGY | Facility: MEDICAL CENTER | Age: 30
End: 2020-07-02
Attending: PHYSICIAN ASSISTANT
Payer: COMMERCIAL

## 2020-07-02 DIAGNOSIS — R10.2 PELVIC PAIN: ICD-10-CM

## 2020-07-02 DIAGNOSIS — R10.30 LOWER ABDOMINAL PAIN: ICD-10-CM

## 2020-07-02 PROCEDURE — 74176 CT ABD & PELVIS W/O CONTRAST: CPT

## 2020-07-03 NOTE — TELEPHONE ENCOUNTER
I spoke with Lakesha today regarding her CT results which were positive only for some trace free fluid in her pelvis which was also present on her ultrasound yesterday likely from her involuting ovarian cyst.  It was also positive for some diverticulosis without diverticulitis.  No other abnormal findings.  Patient verbalized appreciation for phone call.  She is aware that her results have been released to her my chart for her to be able to access for review.

## 2020-07-04 DIAGNOSIS — N30.01 ACUTE CYSTITIS WITH HEMATURIA: ICD-10-CM

## 2020-07-04 DIAGNOSIS — R10.2 PELVIC PAIN: ICD-10-CM

## 2020-07-04 RX ORDER — SULFAMETHOXAZOLE AND TRIMETHOPRIM 800; 160 MG/1; MG/1
1 TABLET ORAL 2 TIMES DAILY
Qty: 14 TAB | Refills: 0 | Status: SHIPPED | OUTPATIENT
Start: 2020-07-04 | End: 2020-07-11

## 2020-07-04 ASSESSMENT — ENCOUNTER SYMPTOMS
VOMITING: 0
BLOOD IN STOOL: 0
CHANGE IN BOWEL HABIT: 0
CONSTIPATION: 0
ABDOMINAL PAIN: 1
HEARTBURN: 0
FEVER: 0
NAUSEA: 1
ANOREXIA: 1
COUGH: 0
DIARRHEA: 0

## 2020-07-05 LAB
BACTERIA UR CULT: ABNORMAL
BACTERIA UR CULT: ABNORMAL
SIGNIFICANT IND 70042: ABNORMAL
SITE SITE: ABNORMAL
SOURCE SOURCE: ABNORMAL

## 2020-07-07 ENCOUNTER — TELEPHONE (OUTPATIENT)
Dept: URGENT CARE | Facility: CLINIC | Age: 30
End: 2020-07-07

## 2020-07-07 NOTE — TELEPHONE ENCOUNTER
I called patient this morning to make sure that she had received my notification on my chart that she had a urinary tract infection and had antibiotics to be picked up at the pharmacy.  Patient states she picked them up and has been taking her medication and feels significantly better.  Patient was appreciative for the call.

## 2021-04-01 ENCOUNTER — HOSPITAL ENCOUNTER (OUTPATIENT)
Facility: MEDICAL CENTER | Age: 31
End: 2021-04-01
Attending: OBSTETRICS & GYNECOLOGY | Admitting: OBSTETRICS & GYNECOLOGY
Payer: COMMERCIAL

## 2021-06-18 ENCOUNTER — OFFICE VISIT (OUTPATIENT)
Dept: MEDICAL GROUP | Facility: PHYSICIAN GROUP | Age: 31
End: 2021-06-18
Payer: COMMERCIAL

## 2021-06-18 VITALS
BODY MASS INDEX: 44.9 KG/M2 | WEIGHT: 244 LBS | OXYGEN SATURATION: 96 % | SYSTOLIC BLOOD PRESSURE: 122 MMHG | RESPIRATION RATE: 16 BRPM | HEIGHT: 62 IN | DIASTOLIC BLOOD PRESSURE: 80 MMHG | HEART RATE: 75 BPM | TEMPERATURE: 97.2 F

## 2021-06-18 DIAGNOSIS — Z13.21 ENCOUNTER FOR VITAMIN DEFICIENCY SCREENING: ICD-10-CM

## 2021-06-18 DIAGNOSIS — Z13.220 ENCOUNTER FOR SCREENING FOR LIPID DISORDER: ICD-10-CM

## 2021-06-18 DIAGNOSIS — Z00.00 ENCOUNTER FOR HEALTH MAINTENANCE EXAMINATION IN ADULT: ICD-10-CM

## 2021-06-18 DIAGNOSIS — L65.9 HAIR LOSS: ICD-10-CM

## 2021-06-18 DIAGNOSIS — Z13.29 SCREENING FOR THYROID DISORDER: ICD-10-CM

## 2021-06-18 DIAGNOSIS — L73.2 AXILLARY HIDRADENITIS SUPPURATIVA: ICD-10-CM

## 2021-06-18 PROCEDURE — 99215 OFFICE O/P EST HI 40 MIN: CPT | Performed by: NURSE PRACTITIONER

## 2021-06-18 ASSESSMENT — PATIENT HEALTH QUESTIONNAIRE - PHQ9: CLINICAL INTERPRETATION OF PHQ2 SCORE: 0

## 2021-06-18 NOTE — ASSESSMENT & PLAN NOTE
Patient reports increase hair loss over the last 2 years. She reports chunks come out in the shower.

## 2021-06-18 NOTE — PROGRESS NOTES
CC: establish care    HPI:  Tayler presents with the following    Axillary hidradenitis suppurativa  This is a chronic issue. Patient reports that she gets 1-2 flares a year for the last few years. She has not had any infection with this. She uses Hibiclens to help minimize her outbreaks.     Hair loss  Patient reports increase hair loss over the last 2 years. She reports chunks come out in the shower.       Patient Active Problem List    Diagnosis Date Noted   • Hair loss 06/18/2021   • Morbid obesity with BMI of 45.0-49.9, adult ([facility]) 11/08/2019   • Axillary hidradenitis suppurativa 11/08/2019   • Seasonal allergies 09/23/2013   • Obese 05/06/2011       Current Outpatient Medications   Medication Sig Dispense Refill   • Multiple Vitamin (MULTI-VITAMIN DAILY PO) Take  by mouth.     • VITAMIN D PO Take  by mouth.       No current facility-administered medications for this visit.       Allergies as of 06/18/2021 - Reviewed 06/18/2021   Allergen Reaction Noted   • No known drug allergy  11/16/2011        Social History     Socioeconomic History   • Marital status:      Spouse name: Not on file   • Number of children: Not on file   • Years of education: Not on file   • Highest education level: Not on file   Occupational History   • Not on file   Tobacco Use   • Smoking status: Never Smoker   • Smokeless tobacco: Never Used   Vaping Use   • Vaping Use: Never used   Substance and Sexual Activity   • Alcohol use: Yes     Comment: occasonal/weekend    • Drug use: No   • Sexual activity: Yes     Partners: Male     Birth control/protection: None   Other Topics Concern   • Not on file   Social History Narrative   • Not on file     Social Determinants of Health     Financial Resource Strain:    • Difficulty of Paying Living Expenses:    Food Insecurity:    • Worried About Running Out of Food in the Last Year:    • Ran Out of Food in the Last Year:    Transportation Needs:    • Lack of Transportation (Medical):    •  Lack of Transportation (Non-Medical):    Physical Activity:    • Days of Exercise per Week:    • Minutes of Exercise per Session:    Stress:    • Feeling of Stress :    Social Connections:    • Frequency of Communication with Friends and Family:    • Frequency of Social Gatherings with Friends and Family:    • Attends L[employer] Services:    • Active Member of Clubs or Organizations:    • Attends Club or Organization Meetings:    • Marital Status:    Intimate Partner Violence:    • Fear of Current or Ex-Partner:    • Emotionally Abused:    • Physically Abused:    • Sexually Abused:        Family History   Problem Relation Age of Onset   • Diabetes Mother         insulin   • Diabetes Paternal Aunt    • Heart Disease Paternal Aunt    • Diabetes Paternal Uncle    • Heart Disease Paternal Uncle    • Cancer Neg Hx    • Stroke Neg Hx        Past Medical History:   Diagnosis Date   • Allergy    • Depression    • GERD (gastroesophageal reflux disease)    • Headache(784.0)    • Urinary tract infection, site not specified         Past Surgical History:   Procedure Laterality Date   • LUMPECTOMY Right     x3       ROS:  Gen: no fevers/chills, no changes in weight  Pulm: no sob, no cough  CV: no chest pain, no palpitations  GI: no nausea/vomiting, no diarrhea  Neuro: no headaches, no numbness/tingling     Exam:   Vitals:    06/18/21 1623   BP: 122/80   Pulse: 75   Resp: 16   Temp: 36.2 °C (97.2 °F)   SpO2: 96%     Body mass index is 44.63 kg/m².    General: Normal appearing. No distress.  Head: Normocephalic.  Atraumatic.  Eyes: conjunctiva clear, pupils equal and reactive to light accommodation,  Ears: normal shape and contour, canals are clear bilaterally, tympanic membranes are benign  Throat: Oropharynx is without erythema, edema or exudates.   Neck: Supple without JVD.Thyroid is not enlarged.  Pulmonary: Clear to ausculation.  Normal effort. No rales, ronchi, or wheezing.  Cardiovascular: Regular rate and rhythm without  murmur. Carotid and radial pulses are intact and equal bilaterally.  Pedal pulses within normal limits.  Abdomen: Soft, nontender, nondistended. Normal bowel sounds. Liver and spleen are not palpable.  Neurologic: Grossly intact.  Sensation intact.  Lymph: No cervical or supraclavicular lymph nodes are palpable.  Skin: Warm and dry.  No obvious lesions.  Musculoskeletal: No extremity cyanosis, clubbing, or edema.  Strength 5+ and equal bilaterally in all extremities.  Psych: Normal mood and affect. Alert and oriented x3. Judgment and insight is normal.      Assessment and Plan.   31 y.o. female presenting with the following.     1. Axillary hidradenitis suppurativa  - INSULIN FASTING; Future    This is a chronic stable condition. Continue hibiclens to prevent flares.     2. Hair loss  This is a chronic issue.  Patient reports that she has had increased hair loss over the last 2 years.  She did have her thyroid checked last year which was normal.  Will obtain current thyroid testing as well as other hormone labs to try and determine cause of patient's increased hair loss.  - TSH; Future  - FREE THYROXINE; Future  - TESTOSTERONE F&T FEMALES/CHILD; Future  - DHEA; Future  - INSULIN FASTING; Future    3. Encounter for health maintenance examination in adult  - VITAMIN D,25 HYDROXY; Future  - Lipid Profile; Future  - CBC WITHOUT DIFFERENTIAL; Future  - Comp Metabolic Panel; Future    4. Encounter for vitamin deficiency screening  - VITAMIN D,25 HYDROXY; Future    5. Screening for thyroid disorder  - TSH; Future  - FREE THYROXINE; Future    6. Encounter for screening for lipid disorder  - Lipid Profile; Future      Return for as needed or yearly.      I have placed the below orders and discussed them with an approved delegating provider.  The MA is performing the below orders under the direction of Dr. Castellanos.    Please note that this dictation was created using voice recognition software. I have worked with consultants  from the vendor as well as technical experts from [facility] to optimize the interface. I have made every reasonable attempt to correct obvious errors, but I expect that there are errors of grammar and possibly content that I did not discover before finalizing the note.

## 2021-06-18 NOTE — ASSESSMENT & PLAN NOTE
This is a chronic issue. Patient reports that she gets 1-2 flares a year for the last few years. She has not had any infection with this. She uses Hibiclens to help minimize her outbreaks.

## 2021-06-22 ENCOUNTER — HOSPITAL ENCOUNTER (OUTPATIENT)
Dept: LAB | Facility: MEDICAL CENTER | Age: 31
End: 2021-06-22
Attending: NURSE PRACTITIONER
Payer: COMMERCIAL

## 2021-06-22 DIAGNOSIS — Z13.220 ENCOUNTER FOR SCREENING FOR LIPID DISORDER: ICD-10-CM

## 2021-06-22 DIAGNOSIS — Z13.29 SCREENING FOR THYROID DISORDER: ICD-10-CM

## 2021-06-22 DIAGNOSIS — Z13.21 ENCOUNTER FOR VITAMIN DEFICIENCY SCREENING: ICD-10-CM

## 2021-06-22 DIAGNOSIS — Z00.00 ENCOUNTER FOR HEALTH MAINTENANCE EXAMINATION IN ADULT: ICD-10-CM

## 2021-06-22 DIAGNOSIS — L73.2 AXILLARY HIDRADENITIS SUPPURATIVA: ICD-10-CM

## 2021-06-22 DIAGNOSIS — L65.9 HAIR LOSS: ICD-10-CM

## 2021-06-22 LAB
25(OH)D3 SERPL-MCNC: 36 NG/ML (ref 30–100)
ALBUMIN SERPL BCP-MCNC: 4 G/DL (ref 3.2–4.9)
ALBUMIN/GLOB SERPL: 1 G/DL
ALP SERPL-CCNC: 122 U/L (ref 30–99)
ALT SERPL-CCNC: 20 U/L (ref 2–50)
ANION GAP SERPL CALC-SCNC: 10 MMOL/L (ref 7–16)
AST SERPL-CCNC: 18 U/L (ref 12–45)
BILIRUB SERPL-MCNC: 0.6 MG/DL (ref 0.1–1.5)
BUN SERPL-MCNC: 10 MG/DL (ref 8–22)
CALCIUM SERPL-MCNC: 9.3 MG/DL (ref 8.5–10.5)
CHLORIDE SERPL-SCNC: 103 MMOL/L (ref 96–112)
CHOLEST SERPL-MCNC: 162 MG/DL (ref 100–199)
CO2 SERPL-SCNC: 26 MMOL/L (ref 20–33)
CREAT SERPL-MCNC: 0.74 MG/DL (ref 0.5–1.4)
ERYTHROCYTE [DISTWIDTH] IN BLOOD BY AUTOMATED COUNT: 43.5 FL (ref 35.9–50)
FASTING STATUS PATIENT QL REPORTED: NORMAL
GLOBULIN SER CALC-MCNC: 3.9 G/DL (ref 1.9–3.5)
GLUCOSE SERPL-MCNC: 87 MG/DL (ref 65–99)
HCT VFR BLD AUTO: 43.5 % (ref 37–47)
HDLC SERPL-MCNC: 52 MG/DL
HGB BLD-MCNC: 14.4 G/DL (ref 12–16)
LDLC SERPL CALC-MCNC: 92 MG/DL
MCH RBC QN AUTO: 30.1 PG (ref 27–33)
MCHC RBC AUTO-ENTMCNC: 33.1 G/DL (ref 33.6–35)
MCV RBC AUTO: 90.8 FL (ref 81.4–97.8)
PLATELET # BLD AUTO: 297 K/UL (ref 164–446)
PMV BLD AUTO: 10.2 FL (ref 9–12.9)
POTASSIUM SERPL-SCNC: 3.7 MMOL/L (ref 3.6–5.5)
PROT SERPL-MCNC: 7.9 G/DL (ref 6–8.2)
RBC # BLD AUTO: 4.79 M/UL (ref 4.2–5.4)
SODIUM SERPL-SCNC: 139 MMOL/L (ref 135–145)
T4 FREE SERPL-MCNC: 1.45 NG/DL (ref 0.93–1.7)
TRIGL SERPL-MCNC: 88 MG/DL (ref 0–149)
TSH SERPL DL<=0.005 MIU/L-ACNC: 0.62 UIU/ML (ref 0.38–5.33)
WBC # BLD AUTO: 10.6 K/UL (ref 4.8–10.8)

## 2021-06-22 PROCEDURE — 82306 VITAMIN D 25 HYDROXY: CPT

## 2021-06-22 PROCEDURE — 83525 ASSAY OF INSULIN: CPT

## 2021-06-22 PROCEDURE — 85027 COMPLETE CBC AUTOMATED: CPT

## 2021-06-22 PROCEDURE — 80053 COMPREHEN METABOLIC PANEL: CPT

## 2021-06-22 PROCEDURE — 80061 LIPID PANEL: CPT

## 2021-06-22 PROCEDURE — 84403 ASSAY OF TOTAL TESTOSTERONE: CPT

## 2021-06-22 PROCEDURE — 84443 ASSAY THYROID STIM HORMONE: CPT

## 2021-06-22 PROCEDURE — 84270 ASSAY OF SEX HORMONE GLOBUL: CPT

## 2021-06-22 PROCEDURE — 84402 ASSAY OF FREE TESTOSTERONE: CPT

## 2021-06-22 PROCEDURE — 82626 DEHYDROEPIANDROSTERONE: CPT

## 2021-06-22 PROCEDURE — 84439 ASSAY OF FREE THYROXINE: CPT

## 2021-06-22 PROCEDURE — 36415 COLL VENOUS BLD VENIPUNCTURE: CPT

## 2021-06-24 ENCOUNTER — OFFICE VISIT (OUTPATIENT)
Dept: URGENT CARE | Facility: PHYSICIAN GROUP | Age: 31
End: 2021-06-24
Payer: COMMERCIAL

## 2021-06-24 VITALS
RESPIRATION RATE: 16 BRPM | HEART RATE: 74 BPM | TEMPERATURE: 97 F | WEIGHT: 242 LBS | OXYGEN SATURATION: 98 % | SYSTOLIC BLOOD PRESSURE: 126 MMHG | DIASTOLIC BLOOD PRESSURE: 88 MMHG | BODY MASS INDEX: 42.88 KG/M2 | HEIGHT: 63 IN

## 2021-06-24 DIAGNOSIS — F41.0 ANXIETY ATTACK: ICD-10-CM

## 2021-06-24 LAB — INSULIN P FAST SERPL-ACNC: 15 UIU/ML (ref 3–19)

## 2021-06-24 PROCEDURE — 99213 OFFICE O/P EST LOW 20 MIN: CPT | Performed by: NURSE PRACTITIONER

## 2021-06-24 RX ORDER — HYDROXYZINE HYDROCHLORIDE 25 MG/1
25 TABLET, FILM COATED ORAL 3 TIMES DAILY PRN
Qty: 30 TABLET | Refills: 0 | Status: SHIPPED | OUTPATIENT
Start: 2021-06-24 | End: 2021-07-09

## 2021-06-24 ASSESSMENT — ENCOUNTER SYMPTOMS
HALLUCINATIONS: 0
NERVOUS/ANXIOUS: 1
FEVER: 0
INSOMNIA: 0
MYALGIAS: 1
CHILLS: 0
TINGLING: 0
SHORTNESS OF BREATH: 1

## 2021-06-24 ASSESSMENT — FIBROSIS 4 INDEX: FIB4 SCORE: .4472135954999579392

## 2021-06-24 NOTE — LETTER
June 24, 2021         Patient: Tayler Ferguson   YOB: 1990   Date of Visit: 6/24/2021           To Whom it May Concern:    Tayler Ferguson was seen in my clinic on 6/24/2021. Please excuse Tayler from work today, 06/24/2021 and tomorrow 06/25/2021.    If you have any questions or concerns, please don't hesitate to call.        Sincerely,           Abdulkadir Seay A.P.N.  Electronically Signed

## 2021-06-24 NOTE — PROGRESS NOTES
Subjective:      Tayler Ferguson is a 31 y.o. female who presents with Anxiety (sob, chest tightness, stiff neck and shoulder, x4 hours.)            HPI   New. 31 year old female presents for anxiety attack she had at approx noon today. She states she has shortness of breath, chest tightness, and stiff neck and shoulders. She reportedly was hyperventilating per spouse. States better now here in clinic. She has never had anxiety issues before but does report stress in her workplace. PCP: Kayley who she has recently seen.    No known drug allergy  Current Outpatient Medications on File Prior to Visit   Medication Sig Dispense Refill   • Multiple Vitamin (MULTI-VITAMIN DAILY PO) Take  by mouth.     • VITAMIN D PO Take  by mouth.       No current facility-administered medications on file prior to visit.     Social History     Socioeconomic History   • Marital status:      Spouse name: Not on file   • Number of children: Not on file   • Years of education: Not on file   • Highest education level: Not on file   Occupational History   • Not on file   Tobacco Use   • Smoking status: Never Smoker   • Smokeless tobacco: Never Used   Vaping Use   • Vaping Use: Never used   Substance and Sexual Activity   • Alcohol use: Yes     Comment: occasonal/weekend    • Drug use: No   • Sexual activity: Yes     Partners: Male     Birth control/protection: None   Other Topics Concern   • Not on file   Social History Narrative   • Not on file     Social Determinants of Health     Financial Resource Strain:    • Difficulty of Paying Living Expenses:    Food Insecurity:    • Worried About Running Out of Food in the Last Year:    • Ran Out of Food in the Last Year:    Transportation Needs:    • Lack of Transportation (Medical):    • Lack of Transportation (Non-Medical):    Physical Activity:    • Days of Exercise per Week:    • Minutes of Exercise per Session:    Stress:    • Feeling of Stress :    Social Connections:    • Frequency of  "Communication with Friends and Family:    • Frequency of Social Gatherings with Friends and Family:    • Attends Yazdanism Services:    • Active Member of Clubs or Organizations:    • Attends Club or Organization Meetings:    • Marital Status:    Intimate Partner Violence:    • Fear of Current or Ex-Partner:    • Emotionally Abused:    • Physically Abused:    • Sexually Abused:      Breast Cancer-related family history is not on file.      Review of Systems   Constitutional: Negative for chills and fever.   Respiratory: Positive for shortness of breath.    Cardiovascular:        Chest tightness   Musculoskeletal: Positive for myalgias.   Neurological: Negative for tingling.   Psychiatric/Behavioral: Negative for hallucinations. The patient is nervous/anxious. The patient does not have insomnia.           Objective:     /88 (BP Location: Left arm, Patient Position: Sitting, BP Cuff Size: Adult long)   Pulse 74   Temp 36.1 °C (97 °F) (Temporal)   Resp 16   Ht 1.6 m (5' 3\")   Wt 110 kg (242 lb)   SpO2 98%   BMI 42.87 kg/m²      Physical Exam  Constitutional:       Appearance: Normal appearance. She is not ill-appearing.   Cardiovascular:      Rate and Rhythm: Normal rate and regular rhythm.      Heart sounds: No murmur heard.     Pulmonary:      Effort: Pulmonary effort is normal.      Breath sounds: Normal breath sounds.   Musculoskeletal:         General: Normal range of motion.   Skin:     General: Skin is warm and dry.   Neurological:      General: No focal deficit present.      Mental Status: She is alert and oriented to person, place, and time.   Psychiatric:         Mood and Affect: Mood normal.                        Assessment/Plan:        1. Anxiety attack  hydrOXYzine HCl (ATARAX) 25 MG Tab     Patient with acute anxiety attack. Will trial on hydroxyzine at this time with appropriate sedation precautions given.  She is advised to follow up with PCP.  "

## 2021-06-27 LAB
DHEA SERPL-MCNC: 0.79 NG/ML (ref 1.33–7.78)
SHBG SERPL-SCNC: 74 NMOL/L (ref 30–135)
TESTOST FREE SERPL-MCNC: 2.1 PG/ML (ref 1.3–9.2)
TESTOST SERPL-MCNC: 21 NG/DL (ref 9–55)

## 2021-06-30 ENCOUNTER — TELEPHONE (OUTPATIENT)
Dept: URGENT CARE | Facility: PHYSICIAN GROUP | Age: 31
End: 2021-06-30

## 2021-06-30 NOTE — TELEPHONE ENCOUNTER
Pt still has not been able to work. Can she get excused for 6/28-7/1? She has a follow up tomorrow evening with primary care.

## 2021-07-09 ENCOUNTER — OFFICE VISIT (OUTPATIENT)
Dept: MEDICAL GROUP | Facility: PHYSICIAN GROUP | Age: 31
End: 2021-07-09
Payer: COMMERCIAL

## 2021-07-09 VITALS
SYSTOLIC BLOOD PRESSURE: 122 MMHG | OXYGEN SATURATION: 97 % | RESPIRATION RATE: 16 BRPM | WEIGHT: 244 LBS | BODY MASS INDEX: 44.9 KG/M2 | HEART RATE: 76 BPM | HEIGHT: 62 IN | TEMPERATURE: 96.9 F | DIASTOLIC BLOOD PRESSURE: 70 MMHG

## 2021-07-09 DIAGNOSIS — F41.9 ANXIETY: ICD-10-CM

## 2021-07-09 DIAGNOSIS — N97.0 INFERTILITY ASSOCIATED WITH ANOVULATION: ICD-10-CM

## 2021-07-09 DIAGNOSIS — L65.9 HAIR LOSS: ICD-10-CM

## 2021-07-09 PROCEDURE — 99214 OFFICE O/P EST MOD 30 MIN: CPT | Performed by: NURSE PRACTITIONER

## 2021-07-09 ASSESSMENT — ANXIETY QUESTIONNAIRES
4. TROUBLE RELAXING: MORE THAN HALF THE DAYS
1. FEELING NERVOUS, ANXIOUS, OR ON EDGE: MORE THAN HALF THE DAYS
GAD7 TOTAL SCORE: 10
7. FEELING AFRAID AS IF SOMETHING AWFUL MIGHT HAPPEN: MORE THAN HALF THE DAYS
3. WORRYING TOO MUCH ABOUT DIFFERENT THINGS: SEVERAL DAYS
2. NOT BEING ABLE TO STOP OR CONTROL WORRYING: SEVERAL DAYS
6. BECOMING EASILY ANNOYED OR IRRITABLE: SEVERAL DAYS
5. BEING SO RESTLESS THAT IT IS HARD TO SIT STILL: SEVERAL DAYS

## 2021-07-09 ASSESSMENT — FIBROSIS 4 INDEX: FIB4 SCORE: .4472135954999579392

## 2021-07-10 PROBLEM — N97.0 INFERTILITY ASSOCIATED WITH ANOVULATION: Status: ACTIVE | Noted: 2021-07-10

## 2021-07-10 NOTE — PROGRESS NOTES
"      CC:      Follow-up for labs, hair loss, and infertility                                                                                                                              HPI:   Tayler presents today with the following.    Infertility associated with anovulation  Patient reports recently wanting to try for baby with her .  She states that over the last 8 years her and her  have not used protection and she has not had a pregnancy in that time.  Patient reports having irregular periods throughout her 20s.  She states that recently her periods have been more regular than usual.  However, she has not been successful in getting pregnant.  Labs reviewed with patient.  Patient does have a low DHEA.    Hair loss  This is a chronic issue for the patient.  Patient reports increased hair loss over the last several months to years.      Current Outpatient Medications   Medication Sig Dispense Refill   • Multiple Vitamin (MULTI-VITAMIN DAILY PO) Take  by mouth.     • VITAMIN D PO Take  by mouth.       No current facility-administered medications for this visit.       Allergies as of 07/09/2021 - Reviewed 07/09/2021   Allergen Reaction Noted   • No known drug allergy  11/16/2011        ROS:  Gen: no fevers/chills, no changes in weight  Pulm: no sob, no cough  CV: no chest pain, no palpitations  GI: no nausea/vomiting, no diarrhea  Neuro: no headaches, no numbness/tingling  Heme/Lymph: no easy bruising     /70 (BP Location: Right arm, Patient Position: Sitting, BP Cuff Size: Large adult)   Pulse 76   Temp 36.1 °C (96.9 °F) (Temporal)   Resp 16   Ht 1.575 m (5' 2\")   Wt 111 kg (244 lb)   SpO2 97%   BMI 44.63 kg/m²     Physical Exam:  Gen:         Alert and oriented, No apparent distress.  Neck:        No Lymphadenopathy.   Lungs:     Clear to auscultation bilaterally. No wheezes, rales, or rhonchi.   CV:          Regular rate and rhythm. No murmurs, rubs or gallops.         Ext:          No " clubbing, cyanosis, or peripheral edema.  Skin:  All visible skin intact without lesions.       Assessment and Plan:  31 y.o. female with the following issues.    1. Anxiety  This is a chronic issue for the patient.  Patient did try hydroxyzine, however stated that it made her very somnolent.  Patient states her anxiety has been controlled.  Patient to reach out if she feels anxiety is increasing.    2. Infertility associated with anovulation  - FSH/LH; Future  - PROLACTIN; Future  - ESTROGENS FRACTIONATED; Future  - ESTROGEN TOTAL; Future  - PROGESTERONE; Future    3. Hair loss    These are chronic issues for the patient.  Patient reports not using any type of birth control or barrier method for the last 8 years with her  and she has not been able to achieve pregnancy.  I did obtain some baseline hormone labs to work-up patient's hair loss at last visit.  Patient does not have elevated testosterone, which I expected was the cause of her hair loss.  At this time I will obtain additional labs including FSH/LH, progesterone, estrogen, and prolactin.  If patient has signs of low hormones that is most likely leading to anovulation, I will refer patient to either endocrinology and/or infertility specialist.      Return for as needed or yearly.    I have placed the below orders and discussed them with an approved delegating provider.  The MA is performing the below orders under the direction of Dr. Castellanos.    Please note that this dictation was created using voice recognition software. I have worked with consultants from the vendor as well as technical experts from [facility] to optimize the interface. I have made every reasonable attempt to correct obvious errors, but I expect that there are errors of grammar and possibly content that I did not discover before finalizing the note.

## 2021-07-10 NOTE — ASSESSMENT & PLAN NOTE
This is a chronic issue for the patient.  Patient reports increased hair loss over the last several months to years.

## 2021-07-10 NOTE — ASSESSMENT & PLAN NOTE
Patient reports recently wanting to try for baby with her .  She states that over the last 8 years her and her  have not used protection and she has not had a pregnancy in that time.  Patient reports having irregular periods throughout her 20s.  She states that recently her periods have been more regular than usual.  However, she has not been successful in getting pregnant.  Labs reviewed with patient.  Patient does have a low DHEA.

## 2021-07-16 ENCOUNTER — HOSPITAL ENCOUNTER (OUTPATIENT)
Facility: MEDICAL CENTER | Age: 31
End: 2021-07-16
Attending: NURSE PRACTITIONER
Payer: COMMERCIAL

## 2021-07-16 ENCOUNTER — HOSPITAL ENCOUNTER (OUTPATIENT)
Dept: LAB | Facility: MEDICAL CENTER | Age: 31
End: 2021-07-16
Attending: NURSE PRACTITIONER
Payer: COMMERCIAL

## 2021-07-16 ENCOUNTER — HOSPITAL ENCOUNTER (OUTPATIENT)
Dept: RADIOLOGY | Facility: MEDICAL CENTER | Age: 31
End: 2021-07-16
Attending: NURSE PRACTITIONER
Payer: COMMERCIAL

## 2021-07-16 ENCOUNTER — OFFICE VISIT (OUTPATIENT)
Dept: MEDICAL GROUP | Facility: PHYSICIAN GROUP | Age: 31
End: 2021-07-16
Payer: COMMERCIAL

## 2021-07-16 VITALS
TEMPERATURE: 96.8 F | DIASTOLIC BLOOD PRESSURE: 70 MMHG | OXYGEN SATURATION: 95 % | HEART RATE: 73 BPM | WEIGHT: 245 LBS | SYSTOLIC BLOOD PRESSURE: 130 MMHG | HEIGHT: 63 IN | BODY MASS INDEX: 43.41 KG/M2 | RESPIRATION RATE: 16 BRPM

## 2021-07-16 DIAGNOSIS — N12 PYELONEPHRITIS: ICD-10-CM

## 2021-07-16 DIAGNOSIS — R10.2 ACUTE PELVIC PAIN, FEMALE: ICD-10-CM

## 2021-07-16 DIAGNOSIS — R10.2 PELVIC AND PERINEAL PAIN: ICD-10-CM

## 2021-07-16 DIAGNOSIS — R31.29 OTHER MICROSCOPIC HEMATURIA: ICD-10-CM

## 2021-07-16 DIAGNOSIS — N83.201 CYST OF RIGHT OVARY: ICD-10-CM

## 2021-07-16 DIAGNOSIS — R11.2 NAUSEA AND VOMITING, INTRACTABILITY OF VOMITING NOT SPECIFIED, UNSPECIFIED VOMITING TYPE: ICD-10-CM

## 2021-07-16 LAB
ALBUMIN SERPL BCP-MCNC: 3.5 G/DL (ref 3.2–4.9)
ALBUMIN/GLOB SERPL: 1.1 G/DL
ALP SERPL-CCNC: 93 U/L (ref 30–99)
ALT SERPL-CCNC: 20 U/L (ref 2–50)
AMORPH CRY #/AREA URNS HPF: PRESENT /HPF
ANION GAP SERPL CALC-SCNC: 9 MMOL/L (ref 7–16)
APPEARANCE UR: CLEAR
APPEARANCE UR: NORMAL
AST SERPL-CCNC: 18 U/L (ref 12–45)
BACTERIA #/AREA URNS HPF: ABNORMAL /HPF
BASOPHILS # BLD AUTO: 0.3 % (ref 0–1.8)
BASOPHILS # BLD: 0.03 K/UL (ref 0–0.12)
BILIRUB SERPL-MCNC: 0.3 MG/DL (ref 0.1–1.5)
BILIRUB UR QL STRIP.AUTO: NEGATIVE
BILIRUB UR STRIP-MCNC: NORMAL MG/DL
BUN SERPL-MCNC: 12 MG/DL (ref 8–22)
CALCIUM SERPL-MCNC: 8.7 MG/DL (ref 8.5–10.5)
CAOX CRY #/AREA URNS HPF: ABNORMAL /HPF
CHLORIDE SERPL-SCNC: 107 MMOL/L (ref 96–112)
CO2 SERPL-SCNC: 25 MMOL/L (ref 20–33)
COLOR UR AUTO: NORMAL
COLOR UR: YELLOW
CREAT SERPL-MCNC: 0.74 MG/DL (ref 0.5–1.4)
CRP SERPL HS-MCNC: 1.21 MG/DL (ref 0–0.75)
D DIMER PPP IA.FEU-MCNC: 0.29 UG/ML (FEU) (ref 0–0.5)
EOSINOPHIL # BLD AUTO: 0.13 K/UL (ref 0–0.51)
EOSINOPHIL NFR BLD: 1.4 % (ref 0–6.9)
EPI CELLS #/AREA URNS HPF: ABNORMAL /HPF
ERYTHROCYTE [DISTWIDTH] IN BLOOD BY AUTOMATED COUNT: 44.4 FL (ref 35.9–50)
GLOBULIN SER CALC-MCNC: 3.3 G/DL (ref 1.9–3.5)
GLUCOSE SERPL-MCNC: 87 MG/DL (ref 65–99)
GLUCOSE UR STRIP.AUTO-MCNC: NEGATIVE MG/DL
GLUCOSE UR STRIP.AUTO-MCNC: NORMAL MG/DL
HCT VFR BLD AUTO: 41.6 % (ref 37–47)
HGB BLD-MCNC: 13.7 G/DL (ref 12–16)
IMM GRANULOCYTES # BLD AUTO: 0.02 K/UL (ref 0–0.11)
IMM GRANULOCYTES NFR BLD AUTO: 0.2 % (ref 0–0.9)
INT CON NEG: NEGATIVE
INT CON POS: POSITIVE
KETONES UR STRIP.AUTO-MCNC: ABNORMAL MG/DL
KETONES UR STRIP.AUTO-MCNC: NORMAL MG/DL
LEUKOCYTE ESTERASE UR QL STRIP.AUTO: NEGATIVE
LEUKOCYTE ESTERASE UR QL STRIP.AUTO: NORMAL
LYMPHOCYTES # BLD AUTO: 2.02 K/UL (ref 1–4.8)
LYMPHOCYTES NFR BLD: 22 % (ref 22–41)
MCH RBC QN AUTO: 30.1 PG (ref 27–33)
MCHC RBC AUTO-ENTMCNC: 32.9 G/DL (ref 33.6–35)
MCV RBC AUTO: 91.4 FL (ref 81.4–97.8)
MICRO URNS: ABNORMAL
MONOCYTES # BLD AUTO: 0.82 K/UL (ref 0–0.85)
MONOCYTES NFR BLD AUTO: 8.9 % (ref 0–13.4)
NEUTROPHILS # BLD AUTO: 6.15 K/UL (ref 2–7.15)
NEUTROPHILS NFR BLD: 67.2 % (ref 44–72)
NITRITE UR QL STRIP.AUTO: NORMAL
NITRITE UR QL STRIP.AUTO: POSITIVE
NRBC # BLD AUTO: 0 K/UL
NRBC BLD-RTO: 0 /100 WBC
PH UR STRIP.AUTO: 5 [PH] (ref 5–8)
PH UR STRIP.AUTO: 5 [PH] (ref 5–8)
PLATELET # BLD AUTO: 254 K/UL (ref 164–446)
PMV BLD AUTO: 9.9 FL (ref 9–12.9)
POC URINE PREGNANCY TEST: NORMAL
POTASSIUM SERPL-SCNC: 4.1 MMOL/L (ref 3.6–5.5)
PROT SERPL-MCNC: 6.8 G/DL (ref 6–8.2)
PROT UR QL STRIP: 30 MG/DL
PROT UR QL STRIP: NORMAL MG/DL
RBC # BLD AUTO: 4.55 M/UL (ref 4.2–5.4)
RBC # URNS HPF: ABNORMAL /HPF
RBC UR QL AUTO: ABNORMAL
RBC UR QL AUTO: NORMAL
RENAL EPI CELLS #/AREA URNS HPF: NEGATIVE /HPF
SODIUM SERPL-SCNC: 141 MMOL/L (ref 135–145)
SP GR UR STRIP.AUTO: 1.03
SP GR UR STRIP.AUTO: >=1.03
UROBILINOGEN UR STRIP-MCNC: 0.2 MG/DL
UROBILINOGEN UR STRIP.AUTO-MCNC: 0.2 MG/DL
WBC # BLD AUTO: 9.2 K/UL (ref 4.8–10.8)
WBC #/AREA URNS HPF: ABNORMAL /HPF

## 2021-07-16 PROCEDURE — 85025 COMPLETE CBC W/AUTO DIFF WBC: CPT

## 2021-07-16 PROCEDURE — 85379 FIBRIN DEGRADATION QUANT: CPT

## 2021-07-16 PROCEDURE — 81025 URINE PREGNANCY TEST: CPT | Performed by: NURSE PRACTITIONER

## 2021-07-16 PROCEDURE — 81001 URINALYSIS AUTO W/SCOPE: CPT

## 2021-07-16 PROCEDURE — 700117 HCHG RX CONTRAST REV CODE 255: Performed by: NURSE PRACTITIONER

## 2021-07-16 PROCEDURE — 99214 OFFICE O/P EST MOD 30 MIN: CPT | Performed by: NURSE PRACTITIONER

## 2021-07-16 PROCEDURE — 86140 C-REACTIVE PROTEIN: CPT

## 2021-07-16 PROCEDURE — 36415 COLL VENOUS BLD VENIPUNCTURE: CPT

## 2021-07-16 PROCEDURE — 80053 COMPREHEN METABOLIC PANEL: CPT

## 2021-07-16 PROCEDURE — 81002 URINALYSIS NONAUTO W/O SCOPE: CPT | Performed by: NURSE PRACTITIONER

## 2021-07-16 PROCEDURE — 74177 CT ABD & PELVIS W/CONTRAST: CPT

## 2021-07-16 RX ORDER — SULFAMETHOXAZOLE AND TRIMETHOPRIM 800; 160 MG/1; MG/1
1 TABLET ORAL 2 TIMES DAILY
Qty: 14 TABLET | Refills: 0 | Status: SHIPPED | OUTPATIENT
Start: 2021-07-16 | End: 2021-07-19

## 2021-07-16 RX ORDER — HYDROCODONE BITARTRATE AND ACETAMINOPHEN 5; 325 MG/1; MG/1
1 TABLET ORAL EVERY 6 HOURS PRN
Qty: 15 TABLET | Refills: 0 | Status: SHIPPED | OUTPATIENT
Start: 2021-07-16 | End: 2021-07-21

## 2021-07-16 RX ORDER — ONDANSETRON 4 MG/1
4 TABLET, FILM COATED ORAL EVERY 4 HOURS PRN
Qty: 10 TABLET | Refills: 2 | Status: SHIPPED | OUTPATIENT
Start: 2021-07-16 | End: 2021-07-30

## 2021-07-16 RX ADMIN — IOHEXOL 100 ML: 350 INJECTION, SOLUTION INTRAVENOUS at 16:04

## 2021-07-16 ASSESSMENT — FIBROSIS 4 INDEX: FIB4 SCORE: .4472135954999579392

## 2021-07-16 NOTE — ASSESSMENT & PLAN NOTE
"This is an acute issue for 1 day. She started her period 2 days ago. Yesterday she developed 10/10 pain in her pelvis that radiates to her back and down her legs. She also reports a \"hot\" pain in her uterus. She reports her bleeding is light, but has some clots. She used hot towels over her abdomen as well as a muscle relaxer that did not help. She reports that her pain is worse than labour pains.     She reports chills, nausea/vomiting, and dizziness. Denies fever, dysuria, or blood in her urine. She reports having a normal bowel movement yesterday.     She denies history of blood clots or family history of blood clots.  "

## 2021-07-16 NOTE — PROGRESS NOTES
"      CC:  10/10 pelvic and abdominal pain x1 day with nausea and vomiting                                                                                                                              HPI:   Tayler presents today with the following.    Acute pelvic pain, female  This is an acute issue for 1 day. She started her period 2 days ago. Yesterday she developed 10/10 pain in her pelvis that radiates to her back and down her legs. She also reports a \"hot\" pain in her uterus. She reports her bleeding is light, but has some clots. She used hot towels over her abdomen as well as a muscle relaxer that did not help. She reports that her pain is worse than labour pains.     She reports chills, nausea/vomiting, and dizziness. Denies fever, dysuria, or blood in her urine. She reports having a normal bowel movement yesterday.     She denies history of blood clots or family history of blood clots.      Current Outpatient Medications   Medication Sig Dispense Refill   • ondansetron (ZOFRAN) 4 MG Tab tablet Take 1 tablet by mouth every four hours as needed for Nausea/Vomiting for up to 30 doses. 10 tablet 2   • sulfamethoxazole-trimethoprim (BACTRIM DS) 800-160 MG tablet Take 1 tablet by mouth 2 times a day for 7 days. 14 tablet 0   • HYDROcodone-acetaminophen (NORCO) 5-325 MG Tab per tablet Take 1 tablet by mouth every 6 hours as needed for up to 5 days. 15 tablet 0   • Multiple Vitamin (MULTI-VITAMIN DAILY PO) Take  by mouth.     • VITAMIN D PO Take  by mouth.       No current facility-administered medications for this visit.       Allergies as of 07/16/2021 - Reviewed 07/16/2021   Allergen Reaction Noted   • No known drug allergy  11/16/2011        ROS:  Gen: no fevers, +chills, no changes in weight  Pulm: no sob, no cough  CV: no chest pain, no palpitations  GI: + nausea/vomiting, no diarrhea, + abd pain  : no dysuria  MSk: + myalgias  Skin: no rash  Neuro: no headaches, + numbness/tingling      /70 (BP " "Location: Left arm, Patient Position: Sitting, BP Cuff Size: Large adult)   Pulse 73   Temp 36 °C (96.8 °F) (Temporal)   Resp 16   Ht 1.6 m (5' 3\")   Wt 111 kg (245 lb)   LMP 07/14/2021 (Exact Date)   SpO2 95%   Breastfeeding No   BMI 43.40 kg/m²     Physical Exam:  Gen:         Alert and oriented, pt tearful and appears uncomfortable.   Neck:        No Lymphadenopathy.   Lungs:     Clear to auscultation bilaterally. No wheezes, rales, or rhonchi.   CV:          Regular rate and rhythm. No murmurs, rubs or gallops.           Ext:          No clubbing, cyanosis, or peripheral edema.  Skin:  All visible skin intact without lesions.     Lab Results   Component Value Date/Time    POCCOLOR other 07/16/2021 02:07 PM    POCAPPEAR cloudy 07/16/2021 02:07 PM    POCLEUKEST neg 07/16/2021 02:07 PM    POCNITRITE neg 07/16/2021 02:07 PM    POCUROBILIGE 0.2 07/16/2021 02:07 PM    POCPROTEIN 100mg 07/16/2021 02:07 PM    POCURPH 5.0 07/16/2021 02:07 PM    POCBLOOD large 07/16/2021 02:07 PM    POCSPGRV 1.030 07/16/2021 02:07 PM    POCKETONES trace 07/16/2021 02:07 PM    POCBILIRUBIN small 07/16/2021 02:07 PM    POCGLUCUA neg 07/16/2021 02:07 PM          Assessment and Plan:  31 y.o. female with the following issues.    1. Acute pelvic pain, female  - POCT PREGNANCY  - CBC WITH DIFFERENTIAL; Future  - Comp Metabolic Panel; Future  - CRP QUANTITIVE (NON-CARDIAC); Future  - D-DIMER; Future  - POCT Urinalysis  - CT-ABDOMEN-PELVIS WITH; Future  - ondansetron (ZOFRAN) 4 MG Tab tablet; Take 1 tablet by mouth every four hours as needed for Nausea/Vomiting for up to 30 doses.  Dispense: 10 tablet; Refill: 2  - sulfamethoxazole-trimethoprim (BACTRIM DS) 800-160 MG tablet; Take 1 tablet by mouth 2 times a day for 7 days.  Dispense: 14 tablet; Refill: 0  - HYDROcodone-acetaminophen (NORCO) 5-325 MG Tab per tablet; Take 1 tablet by mouth every 6 hours as needed for up to 5 days.  Dispense: 15 tablet; Refill: 0  - URINALYSIS,CULTURE IF " INDICATED; Future    2. Pelvic and perineal pain  - CT-ABDOMEN-PELVIS WITH; Future  - ondansetron (ZOFRAN) 4 MG Tab tablet; Take 1 tablet by mouth every four hours as needed for Nausea/Vomiting for up to 30 doses.  Dispense: 10 tablet; Refill: 2  - sulfamethoxazole-trimethoprim (BACTRIM DS) 800-160 MG tablet; Take 1 tablet by mouth 2 times a day for 7 days.  Dispense: 14 tablet; Refill: 0    3. Nausea and vomiting, intractability of vomiting not specified, unspecified vomiting type  - POCT PREGNANCY  - CBC WITH DIFFERENTIAL; Future  - Comp Metabolic Panel; Future  - CRP QUANTITIVE (NON-CARDIAC); Future  - D-DIMER; Future  - POCT Urinalysis  - CT-ABDOMEN-PELVIS WITH; Future  - ondansetron (ZOFRAN) 4 MG Tab tablet; Take 1 tablet by mouth every four hours as needed for Nausea/Vomiting for up to 30 doses.  Dispense: 10 tablet; Refill: 2  - sulfamethoxazole-trimethoprim (BACTRIM DS) 800-160 MG tablet; Take 1 tablet by mouth 2 times a day for 7 days.  Dispense: 14 tablet; Refill: 0    This is an acute issue. Pt with abnormal urine. POCT pregnancy negative. Will order STAT CT abdomen/pelvis with IV contrast. STAT labs drawn.     4. Pyelonephritis  - ondansetron (ZOFRAN) 4 MG Tab tablet; Take 1 tablet by mouth every four hours as needed for Nausea/Vomiting for up to 30 doses.  Dispense: 10 tablet; Refill: 2  - sulfamethoxazole-trimethoprim (BACTRIM DS) 800-160 MG tablet; Take 1 tablet by mouth 2 times a day for 7 days.  Dispense: 14 tablet; Refill: 0  - HYDROcodone-acetaminophen (NORCO) 5-325 MG Tab per tablet; Take 1 tablet by mouth every 6 hours as needed for up to 5 days.  Dispense: 15 tablet; Refill: 0  - URINALYSIS,CULTURE IF INDICATED; Future    5. Other microscopic hematuria  - URINALYSIS,CULTURE IF INDICATED; Future    6. Cyst of right ovary  - REFERRAL TO OB/GYN SURGEY  - HYDROcodone-acetaminophen (NORCO) 5-325 MG Tab per tablet; Take 1 tablet by mouth every 6 hours as needed for up to 5 days.  Dispense: 15 tablet;  Refill: 0    >>Pt called and notified of lab and imaging results at 1745. Spoke with patient and  regarding results.  Patient has been verbalized understanding.  All questions addressed.    Patient CT showed perinephric stranding in the right kidney.  Patient did also have abnormal urinalysis here in office.  I will send patient's urinalysis off for microscopic evaluation and culture.  Patient's labs show an elevated CRP.  Patient does not have an elevated white count and or bandemia.  There is also an incidental finding on the CT scan of a large 3.9 cm right ovarian cyst.  Will provide patient with referral to OB/GYN as I do not know if patient's large cyst is contributing to her symptoms.  I will provide patient with [place] for pain relief.  I will also provide patient with antinausea medication due to her nausea and vomiting.  Patient will be prescribed 7 days of Bactrim twice daily.  Patient provided with strict ER precautions.  If patient were to develop fever and/or worsening symptoms patient to present to ED.    Obtained and reviewed patient utilization report from [facility] pharmacy database on 7/16/2021 6:25 PM  prior to writing prescription for controlled substance II, III or IV per [place] bill . Based on assessment of the report,my physical exam if necessary and the patient's health problem, the prescription is medically necessary.     Return if symptoms worsen or fail to improve.      I have placed the below orders and discussed them with an approved delegating provider.  The MA is performing the below orders under the direction of Dr. Singh.    Please note that this dictation was created using voice recognition software. I have worked with consultants from the vendor as well as technical experts from [facility] to optimize the interface. I have made every reasonable attempt to correct obvious errors, but I expect that there are errors of grammar and possibly content that I did not  discover before finalizing the note.

## 2021-07-19 RX ORDER — CIPROFLOXACIN 500 MG/1
500 TABLET, FILM COATED ORAL 2 TIMES DAILY
Qty: 14 TABLET | Refills: 0 | Status: SHIPPED | OUTPATIENT
Start: 2021-07-19 | End: 2021-07-26

## 2021-10-13 ENCOUNTER — APPOINTMENT (OUTPATIENT)
Dept: RADIOLOGY | Facility: IMAGING CENTER | Age: 31
End: 2021-10-13
Attending: FAMILY MEDICINE
Payer: COMMERCIAL

## 2021-10-13 ENCOUNTER — OFFICE VISIT (OUTPATIENT)
Dept: URGENT CARE | Facility: PHYSICIAN GROUP | Age: 31
End: 2021-10-13
Payer: COMMERCIAL

## 2021-10-13 VITALS
DIASTOLIC BLOOD PRESSURE: 82 MMHG | SYSTOLIC BLOOD PRESSURE: 116 MMHG | RESPIRATION RATE: 20 BRPM | WEIGHT: 237 LBS | BODY MASS INDEX: 43.61 KG/M2 | OXYGEN SATURATION: 94 % | HEART RATE: 87 BPM | TEMPERATURE: 97.3 F | HEIGHT: 62 IN

## 2021-10-13 DIAGNOSIS — J12.82 PNEUMONIA DUE TO COVID-19 VIRUS: ICD-10-CM

## 2021-10-13 DIAGNOSIS — R06.02 SOB (SHORTNESS OF BREATH): ICD-10-CM

## 2021-10-13 DIAGNOSIS — U07.1 PNEUMONIA DUE TO COVID-19 VIRUS: ICD-10-CM

## 2021-10-13 DIAGNOSIS — R05.9 COUGH: ICD-10-CM

## 2021-10-13 DIAGNOSIS — R50.9 FEVER, UNSPECIFIED FEVER CAUSE: ICD-10-CM

## 2021-10-13 LAB
EXTERNAL QUALITY CONTROL: NORMAL
SARS-COV+SARS-COV-2 AG RESP QL IA.RAPID: POSITIVE

## 2021-10-13 PROCEDURE — 99214 OFFICE O/P EST MOD 30 MIN: CPT | Mod: CS | Performed by: FAMILY MEDICINE

## 2021-10-13 PROCEDURE — 71046 X-RAY EXAM CHEST 2 VIEWS: CPT | Mod: TC | Performed by: FAMILY MEDICINE

## 2021-10-13 PROCEDURE — 87426 SARSCOV CORONAVIRUS AG IA: CPT | Performed by: FAMILY MEDICINE

## 2021-10-13 RX ORDER — BENZONATATE 200 MG/1
200 CAPSULE ORAL 3 TIMES DAILY PRN
Qty: 30 CAPSULE | Refills: 0 | Status: SHIPPED | OUTPATIENT
Start: 2021-10-13

## 2021-10-13 RX ORDER — ASPIRIN 81 MG/1
81 TABLET, CHEWABLE ORAL DAILY
COMMUNITY
End: 2021-10-13

## 2021-10-13 RX ORDER — OMEPRAZOLE 20 MG/1
20 CAPSULE, DELAYED RELEASE ORAL DAILY
COMMUNITY
End: 2021-10-13

## 2021-10-13 RX ORDER — SIMVASTATIN 10 MG
10 TABLET ORAL NIGHTLY
COMMUNITY
End: 2021-10-13

## 2021-10-13 RX ORDER — LOSARTAN POTASSIUM 25 MG/1
25 TABLET ORAL DAILY
COMMUNITY
End: 2021-10-13

## 2021-10-13 RX ORDER — TOBRAMYCIN AND DEXAMETHASONE 3; 1 MG/ML; MG/ML
SUSPENSION/ DROPS OPHTHALMIC
COMMUNITY
Start: 2021-07-17 | End: 2021-10-13

## 2021-10-13 ASSESSMENT — FIBROSIS 4 INDEX: FIB4 SCORE: 0.52

## 2021-10-13 ASSESSMENT — ENCOUNTER SYMPTOMS
WEIGHT LOSS: 0
EYE REDNESS: 0
NAUSEA: 1
VOMITING: 0
EYE DISCHARGE: 0

## 2021-10-13 NOTE — PROGRESS NOTES
"Subjective     Tayler Ferguson is a 31 y.o. female who presents with Cough (fever,body aches,congestion,runny nose,fatigue,sob,x6 days.pt req covid test)            Onset 10/8//21, productive cough with rust colored sputum. +fever. +myalgia. +fatigue. +SOB. Taste and smell \"come and go\". No c19 vaccine or prior infection. No known exposures. No other aggravating or alleviating factors.        Review of Systems   Constitutional: Negative for weight loss.   Eyes: Negative for discharge and redness.   Gastrointestinal: Positive for nausea. Negative for vomiting.   Musculoskeletal: Negative for joint pain.   Skin: Negative for itching and rash.              Objective     /82 (BP Location: Left arm, Patient Position: Sitting, BP Cuff Size: Large adult)   Pulse 87   Temp 36.3 °C (97.3 °F) (Temporal)   Resp 20   Ht 1.575 m (5' 2\")   Wt 108 kg (237 lb)   SpO2 94%   BMI 43.35 kg/m²      Physical Exam  Constitutional:       General: She is not in acute distress.     Appearance: She is well-developed.   HENT:      Head: Normocephalic and atraumatic.      Nose: Congestion present.      Mouth/Throat:      Mouth: Mucous membranes are moist.      Pharynx: No posterior oropharyngeal erythema.   Eyes:      Conjunctiva/sclera: Conjunctivae normal.   Cardiovascular:      Rate and Rhythm: Normal rate and regular rhythm.      Heart sounds: Normal heart sounds. No murmur heard.     Pulmonary:      Effort: Pulmonary effort is normal.      Breath sounds: Normal breath sounds. No wheezing.   Musculoskeletal:      Cervical back: Neck supple.   Lymphadenopathy:      Cervical: No cervical adenopathy.   Skin:     General: Skin is warm and dry.      Findings: No rash.   Neurological:      Mental Status: She is alert and oriented to person, place, and time.                             Assessment & Plan        cxr per radiology:  Mild peripheral right lung opacity could indicate pneumonia    1. Cough  DX-CHEST-2 VIEWS    POCT " "SARS-COV Antigen ROB (Symptomatic Only)    benzonatate (TESSALON) 200 MG capsule   2. Fever, unspecified fever cause  DX-CHEST-2 VIEWS    POCT SARS-COV Antigen ROB (Symptomatic Only)   3. SOB (shortness of breath)  DX-CHEST-2 VIEWS    POCT SARS-COV Antigen ROB (Symptomatic Only)   4. Pneumonia due to COVID-19 virus         Differential diagnosis, natural history, supportive care, and indications for immediate follow-up discussed at length.     Self isolate per CDC protocol    Discussed Regeneron treatment given BMI greater than 40.  They would like to proceed.    · I provided them the \"Fact Sheet for Patients and Parents/Caregivers\"    · I informed them of therapeutic alternatives to Regeneron and the risks and benefits of those alternatives  · I informed them that they have the option to accept or refuse Regeneron  · I informed them that Regeneron is not FDA approved, but that it is authorized for use under emergency by the FDA  · I informed them of the known risks and benefits of Regeneron and discussed the extent to which such risks and benefits are unknown        "

## 2021-10-15 ENCOUNTER — OUTPATIENT INFUSION SERVICES (OUTPATIENT)
Dept: ONCOLOGY | Facility: MEDICAL CENTER | Age: 31
End: 2021-10-15
Attending: FAMILY MEDICINE
Payer: COMMERCIAL

## 2021-10-15 VITALS — OXYGEN SATURATION: 97 % | TEMPERATURE: 98.7 F

## 2021-10-15 DIAGNOSIS — U07.1 PNEUMONIA DUE TO COVID-19 VIRUS: ICD-10-CM

## 2021-10-15 DIAGNOSIS — J12.82 PNEUMONIA DUE TO COVID-19 VIRUS: ICD-10-CM

## 2021-10-15 PROCEDURE — 700111 HCHG RX REV CODE 636 W/ 250 OVERRIDE (IP): Performed by: FAMILY MEDICINE

## 2021-10-15 PROCEDURE — M0243 CASIRIVI AND IMDEVI INFUSION: HCPCS

## 2021-10-15 RX ORDER — IMDEVIMAB 1332 MG/11.1ML
300 INJECTION, SOLUTION, CONCENTRATE INTRAVENOUS ONCE
Status: COMPLETED | OUTPATIENT
Start: 2021-10-15 | End: 2021-10-15

## 2021-10-15 RX ORDER — CASIRIVIMAB 1332 MG/11.1ML
300 INJECTION, SOLUTION, CONCENTRATE INTRAVENOUS ONCE
Status: COMPLETED | OUTPATIENT
Start: 2021-10-15 | End: 2021-10-15

## 2021-10-15 RX ADMIN — IMDEVIMAB 300 MG: 1332 INJECTION, SOLUTION, CONCENTRATE INTRAVENOUS at 08:15

## 2021-10-15 RX ADMIN — CASIRIVIMAB 300 MG: 1332 INJECTION, SOLUTION, CONCENTRATE INTRAVENOUS at 08:15

## 2021-10-15 RX ADMIN — CASIRIVIMAB 300 MG: 1332 INJECTION, SOLUTION, CONCENTRATE INTRAVENOUS at 08:23

## 2021-10-15 RX ADMIN — IMDEVIMAB 300 MG: 1332 INJECTION, SOLUTION, CONCENTRATE INTRAVENOUS at 08:24

## 2021-10-15 NOTE — PROGRESS NOTES
Patient arrives to infusion expansion site for treatment of covid 19. Reviewed POC with patient and agreeable, educated patient regarding treatment, expectations for this appointment, and s/s of treatment reaction, questions and concerns addressed to patient satisfaction, patient medicated per MAR x4 SQ injections to abd, 1 hour of monitoring completed with no s/s of reaction, patient >90% throughout appointment. Patient discharged home ambulatory in no apparent distress.

## 2022-01-31 ENCOUNTER — OFFICE VISIT (OUTPATIENT)
Dept: URGENT CARE | Facility: PHYSICIAN GROUP | Age: 32
End: 2022-01-31
Payer: COMMERCIAL

## 2022-01-31 ENCOUNTER — HOSPITAL ENCOUNTER (OUTPATIENT)
Facility: MEDICAL CENTER | Age: 32
End: 2022-01-31
Attending: STUDENT IN AN ORGANIZED HEALTH CARE EDUCATION/TRAINING PROGRAM
Payer: COMMERCIAL

## 2022-01-31 VITALS
BODY MASS INDEX: 43.98 KG/M2 | HEART RATE: 68 BPM | SYSTOLIC BLOOD PRESSURE: 138 MMHG | DIASTOLIC BLOOD PRESSURE: 82 MMHG | OXYGEN SATURATION: 98 % | HEIGHT: 62 IN | TEMPERATURE: 97 F | WEIGHT: 239 LBS | RESPIRATION RATE: 20 BRPM

## 2022-01-31 DIAGNOSIS — S01.312A TEAR OF LEFT EARLOBE, INITIAL ENCOUNTER: ICD-10-CM

## 2022-01-31 DIAGNOSIS — H60.02 ABSCESS OF LEFT EARLOBE: ICD-10-CM

## 2022-01-31 PROCEDURE — 87077 CULTURE AEROBIC IDENTIFY: CPT

## 2022-01-31 PROCEDURE — 90471 IMMUNIZATION ADMIN: CPT | Performed by: STUDENT IN AN ORGANIZED HEALTH CARE EDUCATION/TRAINING PROGRAM

## 2022-01-31 PROCEDURE — 99214 OFFICE O/P EST MOD 30 MIN: CPT | Mod: 25 | Performed by: STUDENT IN AN ORGANIZED HEALTH CARE EDUCATION/TRAINING PROGRAM

## 2022-01-31 PROCEDURE — 87070 CULTURE OTHR SPECIMN AEROBIC: CPT

## 2022-01-31 PROCEDURE — 90715 TDAP VACCINE 7 YRS/> IM: CPT | Performed by: STUDENT IN AN ORGANIZED HEALTH CARE EDUCATION/TRAINING PROGRAM

## 2022-01-31 PROCEDURE — 87205 SMEAR GRAM STAIN: CPT

## 2022-01-31 RX ORDER — DOXYCYCLINE HYCLATE 100 MG
100 TABLET ORAL 2 TIMES DAILY
Qty: 20 TABLET | Refills: 0 | Status: SHIPPED | OUTPATIENT
Start: 2022-01-31 | End: 2022-02-10

## 2022-01-31 ASSESSMENT — FIBROSIS 4 INDEX: FIB4 SCORE: 0.54

## 2022-01-31 NOTE — LETTER
January 31, 2022         Patient: Tayler Ferguson   YOB: 1990   Date of Visit: 1/31/2022           To Whom it May Concern:    Tayler Ferguson was seen in my clinic on 1/31/2022. Please excuse her for 1/31/22 - 2/1/22. She may return on 2/2/22. We appreciate your cooperation.     If you have any questions or concerns, please don't hesitate to call.        Sincerely,           Elia Chaparro D.O.  Electronically Signed

## 2022-02-01 DIAGNOSIS — H60.02 ABSCESS OF LEFT EARLOBE: ICD-10-CM

## 2022-02-01 DIAGNOSIS — S01.312A TEAR OF LEFT EARLOBE, INITIAL ENCOUNTER: ICD-10-CM
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
## 2022-02-02 LAB
GRAM STN SPEC: NORMAL
SIGNIFICANT IND 70042: NORMAL
SITE SITE: NORMAL
SOURCE SOURCE: NORMAL

## 2022-02-04 LAB
BACTERIA WND AEROBE CULT: ABNORMAL
BACTERIA WND AEROBE CULT: ABNORMAL
GRAM STN SPEC: ABNORMAL
SIGNIFICANT IND 70042: ABNORMAL
SITE SITE: ABNORMAL
SOURCE SOURCE: ABNORMAL

## 2022-11-16 ENCOUNTER — NON-PROVIDER VISIT (OUTPATIENT)
Dept: MEDICAL GROUP | Facility: PHYSICIAN GROUP | Age: 32
End: 2022-11-16
Payer: COMMERCIAL

## 2022-11-16 DIAGNOSIS — Z23 NEED FOR VACCINATION: ICD-10-CM

## 2022-11-16 PROCEDURE — 90686 IIV4 VACC NO PRSV 0.5 ML IM: CPT | Performed by: NURSE PRACTITIONER

## 2022-11-16 PROCEDURE — 90471 IMMUNIZATION ADMIN: CPT | Performed by: NURSE PRACTITIONER

## 2022-11-16 NOTE — PROGRESS NOTES
"Tayler Norman Cresson Phyllis is a 32 y.o. female here for a non-provider visit for:   FLU    Reason for immunization: Annual Flu Vaccine  Immunization records indicate need for vaccine: Yes, confirmed with Epic  Minimum interval has been met for this vaccine: Yes  ABN completed: Yes    VIS Dated  08/2021 was given to patient: Yes  All IAC Questionnaire questions were answered \"No.\"    Patient tolerated injection and no adverse effects were observed or reported: Yes    Pt scheduled for next dose in series: No    "